# Patient Record
Sex: FEMALE | Race: WHITE | NOT HISPANIC OR LATINO | URBAN - METROPOLITAN AREA
[De-identification: names, ages, dates, MRNs, and addresses within clinical notes are randomized per-mention and may not be internally consistent; named-entity substitution may affect disease eponyms.]

---

## 2018-02-15 ENCOUNTER — EMERGENCY (EMERGENCY)
Facility: HOSPITAL | Age: 48
LOS: 1 days | Discharge: HOME | End: 2018-02-15
Attending: EMERGENCY MEDICINE | Admitting: EMERGENCY MEDICINE
Payer: MEDICAID

## 2018-02-15 ENCOUNTER — APPOINTMENT (OUTPATIENT)
Dept: INTERNAL MEDICINE | Facility: CLINIC | Age: 48
End: 2018-02-15

## 2018-02-15 VITALS
OXYGEN SATURATION: 100 % | TEMPERATURE: 98 F | DIASTOLIC BLOOD PRESSURE: 69 MMHG | HEART RATE: 61 BPM | HEIGHT: 64 IN | SYSTOLIC BLOOD PRESSURE: 107 MMHG | WEIGHT: 149.91 LBS | RESPIRATION RATE: 16 BRPM

## 2018-02-15 DIAGNOSIS — R10.13 EPIGASTRIC PAIN: ICD-10-CM

## 2018-02-15 LAB
ALBUMIN SERPL ELPH-MCNC: 3.6 G/DL — SIGNIFICANT CHANGE UP (ref 3.3–5)
ALP SERPL-CCNC: 55 U/L — SIGNIFICANT CHANGE UP (ref 40–120)
ALT FLD-CCNC: 14 U/L — SIGNIFICANT CHANGE UP (ref 12–78)
AMYLASE P1 CFR SERPL: 51 U/L — SIGNIFICANT CHANGE UP (ref 25–115)
ANION GAP SERPL CALC-SCNC: 8 MMOL/L — SIGNIFICANT CHANGE UP (ref 5–17)
AST SERPL-CCNC: 18 U/L — SIGNIFICANT CHANGE UP (ref 15–37)
BASOPHILS # BLD AUTO: 0.1 K/UL — SIGNIFICANT CHANGE UP (ref 0–0.2)
BASOPHILS NFR BLD AUTO: 1.1 % — SIGNIFICANT CHANGE UP (ref 0–2)
BILIRUB SERPL-MCNC: 0.3 MG/DL — SIGNIFICANT CHANGE UP (ref 0.2–1.2)
BUN SERPL-MCNC: 11 MG/DL — SIGNIFICANT CHANGE UP (ref 7–23)
CALCIUM SERPL-MCNC: 8.4 MG/DL — LOW (ref 8.5–10.1)
CHLORIDE SERPL-SCNC: 107 MMOL/L — SIGNIFICANT CHANGE UP (ref 96–108)
CO2 SERPL-SCNC: 25 MMOL/L — SIGNIFICANT CHANGE UP (ref 22–31)
CREAT SERPL-MCNC: 0.68 MG/DL — SIGNIFICANT CHANGE UP (ref 0.5–1.3)
EOSINOPHIL # BLD AUTO: 0.7 K/UL — HIGH (ref 0–0.5)
EOSINOPHIL NFR BLD AUTO: 8.2 % — HIGH (ref 0–6)
GLUCOSE SERPL-MCNC: 93 MG/DL — SIGNIFICANT CHANGE UP (ref 70–99)
HCG SERPL-ACNC: <1 MIU/ML — SIGNIFICANT CHANGE UP
HCT VFR BLD CALC: 40.9 % — SIGNIFICANT CHANGE UP (ref 34.5–45)
HGB BLD-MCNC: 13.5 G/DL — SIGNIFICANT CHANGE UP (ref 11.5–15.5)
LIDOCAIN IGE QN: 184 U/L — SIGNIFICANT CHANGE UP (ref 73–393)
LYMPHOCYTES # BLD AUTO: 1.9 K/UL — SIGNIFICANT CHANGE UP (ref 1–3.3)
LYMPHOCYTES # BLD AUTO: 23.8 % — SIGNIFICANT CHANGE UP (ref 13–44)
MCHC RBC-ENTMCNC: 29 PG — SIGNIFICANT CHANGE UP (ref 27–34)
MCHC RBC-ENTMCNC: 33 GM/DL — SIGNIFICANT CHANGE UP (ref 32–36)
MCV RBC AUTO: 88 FL — SIGNIFICANT CHANGE UP (ref 80–100)
MONOCYTES # BLD AUTO: 0.5 K/UL — SIGNIFICANT CHANGE UP (ref 0–0.9)
MONOCYTES NFR BLD AUTO: 6.4 % — SIGNIFICANT CHANGE UP (ref 1–9)
NEUTROPHILS # BLD AUTO: 4.9 K/UL — SIGNIFICANT CHANGE UP (ref 1.8–7.4)
NEUTROPHILS NFR BLD AUTO: 60.6 % — SIGNIFICANT CHANGE UP (ref 43–77)
PLATELET # BLD AUTO: 221 K/UL — SIGNIFICANT CHANGE UP (ref 150–400)
POTASSIUM SERPL-MCNC: 4.3 MMOL/L — SIGNIFICANT CHANGE UP (ref 3.5–5.3)
POTASSIUM SERPL-SCNC: 4.3 MMOL/L — SIGNIFICANT CHANGE UP (ref 3.5–5.3)
PROT SERPL-MCNC: 7.3 G/DL — SIGNIFICANT CHANGE UP (ref 6–8.3)
RBC # BLD: 4.64 M/UL — SIGNIFICANT CHANGE UP (ref 3.8–5.2)
RBC # FLD: 12.2 % — SIGNIFICANT CHANGE UP (ref 10.3–14.5)
SODIUM SERPL-SCNC: 140 MMOL/L — SIGNIFICANT CHANGE UP (ref 135–145)
WBC # BLD: 8.1 K/UL — SIGNIFICANT CHANGE UP (ref 3.8–10.5)
WBC # FLD AUTO: 8.1 K/UL — SIGNIFICANT CHANGE UP (ref 3.8–10.5)

## 2018-02-15 PROCEDURE — 93010 ELECTROCARDIOGRAM REPORT: CPT

## 2018-02-15 PROCEDURE — 99285 EMERGENCY DEPT VISIT HI MDM: CPT

## 2018-02-15 PROCEDURE — 83690 ASSAY OF LIPASE: CPT

## 2018-02-15 PROCEDURE — 93005 ELECTROCARDIOGRAM TRACING: CPT

## 2018-02-15 PROCEDURE — 96374 THER/PROPH/DIAG INJ IV PUSH: CPT

## 2018-02-15 PROCEDURE — 96375 TX/PRO/DX INJ NEW DRUG ADDON: CPT

## 2018-02-15 PROCEDURE — 99284 EMERGENCY DEPT VISIT MOD MDM: CPT | Mod: 25

## 2018-02-15 PROCEDURE — 82150 ASSAY OF AMYLASE: CPT

## 2018-02-15 PROCEDURE — 80053 COMPREHEN METABOLIC PANEL: CPT

## 2018-02-15 PROCEDURE — 84702 CHORIONIC GONADOTROPIN TEST: CPT

## 2018-02-15 PROCEDURE — 85027 COMPLETE CBC AUTOMATED: CPT

## 2018-02-15 RX ORDER — PANTOPRAZOLE SODIUM 20 MG/1
40 TABLET, DELAYED RELEASE ORAL ONCE
Qty: 0 | Refills: 0 | Status: COMPLETED | OUTPATIENT
Start: 2018-02-15 | End: 2018-02-15

## 2018-02-15 RX ORDER — ONDANSETRON 8 MG/1
4 TABLET, FILM COATED ORAL ONCE
Qty: 0 | Refills: 0 | Status: COMPLETED | OUTPATIENT
Start: 2018-02-15 | End: 2018-02-15

## 2018-02-15 RX ORDER — OMEPRAZOLE 10 MG/1
1 CAPSULE, DELAYED RELEASE ORAL
Qty: 14 | Refills: 0 | OUTPATIENT
Start: 2018-02-15 | End: 2018-02-28

## 2018-02-15 RX ORDER — SODIUM CHLORIDE 9 MG/ML
1000 INJECTION INTRAMUSCULAR; INTRAVENOUS; SUBCUTANEOUS ONCE
Qty: 0 | Refills: 0 | Status: COMPLETED | OUTPATIENT
Start: 2018-02-15 | End: 2018-02-15

## 2018-02-15 RX ADMIN — PANTOPRAZOLE SODIUM 40 MILLIGRAM(S): 20 TABLET, DELAYED RELEASE ORAL at 12:26

## 2018-02-15 RX ADMIN — ONDANSETRON 4 MILLIGRAM(S): 8 TABLET, FILM COATED ORAL at 12:26

## 2018-02-15 NOTE — ED PROVIDER NOTE - OBJECTIVE STATEMENT
family xlator per pt request  pt c/o few months of burning pain in chest radiating from epigatrum to throat, worse with eating or drinking. no fevers, chills, ha, sob, cough, diarrhea, dysuria.  pmd - ozcan

## 2018-02-15 NOTE — ED ADULT NURSE NOTE - CHIEF COMPLAINT QUOTE
abd bloating & bitter taste in month x 1 month  "has appointment w/ GI didn't want to wait until 2pm"

## 2018-02-15 NOTE — ED PROVIDER NOTE - MEDICAL DECISION MAKING DETAILS
pt c/o few months worsening buurning chest pain radiating from epigastric to throat, worse with eating or drinking - ekg/labs/ppi/zofran/ivf

## 2018-02-15 NOTE — ED ADULT TRIAGE NOTE - CHIEF COMPLAINT QUOTE
abd bloating & bitter taste in month x 1 month  "has appointment w/ GI didn't want to wait until 2pm" abd bloating & bitter taste in month x 1 month  denies pain  "has appointment w/ GI didn't want to wait until 2pm"

## 2018-02-15 NOTE — CONSULT NOTE ADULT - PROBLEM SELECTOR RECOMMENDATION 9
likely secondary to GERD  no urgency for endoscopy as inpatient as this pain is chronic and pt denies use of NSAIDs, tobacco  would send patient home on PPI BID and Carafate TID  outpatient endoscopy

## 2018-02-15 NOTE — ED PROVIDER NOTE - PROGRESS NOTE DETAILS
GI seen preet pt, requests d/c with PPI to f/u as outpt. Reevaluated patient at bedside.  Patient feeling improved.  Discussed the results of all diagnostic testing in ED and copies of all reports given.   An opportunity to ask questions was given.  Discussed the importance of prompt, close medical follow-up.  Patient will return with any changes, concerns or persistent / worsening symptoms.  Understanding of all instructions verbalized.

## 2018-02-22 ENCOUNTER — APPOINTMENT (OUTPATIENT)
Dept: GASTROENTEROLOGY | Facility: CLINIC | Age: 48
End: 2018-02-22

## 2019-02-13 NOTE — CONSULT NOTE ADULT - SUBJECTIVE AND OBJECTIVE BOX
Size Of Lesion: 1 cm Detail Level: Detailed Morphology Per Location (Optional): mobile nodule Chief Complaint:  Patient is a 48y old  Female who presents with a chief complaint of epigastric abdominal pain    HPI: 48 year old male with no significant past medical history presents after a few months of burning pain in chest radiating from epigastrium to throat, worse with eating or drinking. Pt had appointment to see outpatient GI today but they cancelled the appointment. Pt has a non-tender abdomen, denies vomiting, BRBPR, melena, diarrhea, abnormal bowel habits.   Had EGD/Colon 2-3 years ago as per pt, both were negative.       Allergies:  No Known Allergies      Medications:      PMHX/PSHX:  No pertinent past medical history  No significant past surgical history      Family history:      Social History: denies etoh, tobacco, illicit drug use     ROS:     General:  No wt loss, fevers, chills, night sweats, fatigue,   Eyes:  Good vision, no reported pain  ENT:  No sore throat, pain, runny nose, dysphagia  CV:  No pain, palpitations, hypo/hypertension  Resp:  No dyspnea, cough, tachypnea, wheezing  GI:  + epigastric pain, + nausea, No vomiting, No diarrhea, No constipation, No weight loss, No fever, No pruritis, No rectal bleeding, No tarry stools, No dysphagia,  :  No pain, bleeding, incontinence, nocturia  Muscle:  No pain, weakness  Neuro:  No weakness, tingling, memory problems  Psych:  No fatigue, insomnia, mood problems, depression  Endocrine:  No polyuria, polydipsia, cold/heat intolerance  Heme:  No petechiae, ecchymosis, easy bruisability  Skin:  No rash, tattoos, scars, edema      PHYSICAL EXAM:   Vital Signs:  Vital Signs Last 24 Hrs  T(C): 36.8 (15 Feb 2018 11:28), Max: 36.8 (15 Feb 2018 11:28)  T(F): 98.2 (15 Feb 2018 11:28), Max: 98.2 (15 Feb 2018 11:28)  HR: 61 (15 Feb 2018 11:28) (61 - 61)  BP: 107/69 (15 Feb 2018 11:28) (107/69 - 107/69)  BP(mean): --  RR: 16 (15 Feb 2018 11:28) (16 - 16)  SpO2: 100% (15 Feb 2018 11:28) (100% - 100%)  Daily Height in cm: 162.56 (15 Feb 2018 11:28)    Daily     GENERAL:  Appears stated age, well-groomed, well-nourished, no distress  HEENT:  NC/AT,  conjunctivae clear and pink, no thyromegaly, nodules, adenopathy, no JVD, sclera -anicteric  CHEST:  Full & symmetric excursion, no increased effort, breath sounds clear  HEART:  Regular rhythm, S1, S2, no murmur/rub/S3/S4, no abdominal bruit, no edema  ABDOMEN:  Soft, non-tender, non-distended, normoactive bowel sounds,  no masses ,no hepato-splenomegaly, no signs of chronic liver disease  EXTREMITIES:  no cyanosis, clubbing or edema  SKIN:  No rash/erythema/ecchymoses/petechiae/wounds/abscess/warm/dry  NEURO:  Alert, oriented, no asterixis, no tremor, no encephalopathy    LABS:                        13.5   8.1   )-----------( 221      ( 15 Feb 2018 12:19 )             40.9     02-15    140  |  107  |  11  ----------------------------<  93  4.3   |  25  |  0.68    Ca    8.4<L>      15 Feb 2018 12:19    TPro  7.3  /  Alb  3.6  /  TBili  0.3  /  DBili  x   /  AST  18  /  ALT  14  /  AlkPhos  55  02-15    LIVER FUNCTIONS - ( 15 Feb 2018 12:19 )  Alb: 3.6 g/dL / Pro: 7.3 g/dL / ALK PHOS: 55 U/L / ALT: 14 U/L / AST: 18 U/L / GGT: x               Amylase Serum51      Lipase [id number]       Ammonia--      Imaging:

## 2020-04-14 ENCOUNTER — EMERGENCY (EMERGENCY)
Facility: HOSPITAL | Age: 50
LOS: 1 days | Discharge: HOME | End: 2020-04-14
Attending: EMERGENCY MEDICINE | Admitting: EMERGENCY MEDICINE
Payer: MEDICAID

## 2020-04-14 VITALS
OXYGEN SATURATION: 96 % | TEMPERATURE: 98 F | DIASTOLIC BLOOD PRESSURE: 60 MMHG | HEART RATE: 56 BPM | SYSTOLIC BLOOD PRESSURE: 88 MMHG | RESPIRATION RATE: 14 BRPM | WEIGHT: 162.04 LBS | HEIGHT: 64 IN

## 2020-04-14 VITALS
HEART RATE: 61 BPM | SYSTOLIC BLOOD PRESSURE: 115 MMHG | RESPIRATION RATE: 17 BRPM | OXYGEN SATURATION: 98 % | DIASTOLIC BLOOD PRESSURE: 71 MMHG | TEMPERATURE: 98 F

## 2020-04-14 LAB
ALBUMIN SERPL ELPH-MCNC: 3.8 G/DL — SIGNIFICANT CHANGE UP (ref 3.3–5)
ALP SERPL-CCNC: 53 U/L — SIGNIFICANT CHANGE UP (ref 40–120)
ALT FLD-CCNC: 16 U/L — SIGNIFICANT CHANGE UP (ref 12–78)
ANION GAP SERPL CALC-SCNC: 7 MMOL/L — SIGNIFICANT CHANGE UP (ref 5–17)
APPEARANCE UR: CLEAR — SIGNIFICANT CHANGE UP
AST SERPL-CCNC: 16 U/L — SIGNIFICANT CHANGE UP (ref 15–37)
BASOPHILS # BLD AUTO: 0.05 K/UL — SIGNIFICANT CHANGE UP (ref 0–0.2)
BASOPHILS NFR BLD AUTO: 0.5 % — SIGNIFICANT CHANGE UP (ref 0–2)
BILIRUB SERPL-MCNC: 0.3 MG/DL — SIGNIFICANT CHANGE UP (ref 0.2–1.2)
BILIRUB UR-MCNC: NEGATIVE — SIGNIFICANT CHANGE UP
BUN SERPL-MCNC: 14 MG/DL — SIGNIFICANT CHANGE UP (ref 7–23)
CALCIUM SERPL-MCNC: 8.9 MG/DL — SIGNIFICANT CHANGE UP (ref 8.5–10.1)
CHLORIDE SERPL-SCNC: 107 MMOL/L — SIGNIFICANT CHANGE UP (ref 96–108)
CO2 SERPL-SCNC: 24 MMOL/L — SIGNIFICANT CHANGE UP (ref 22–31)
COLOR SPEC: YELLOW — SIGNIFICANT CHANGE UP
CREAT SERPL-MCNC: 0.86 MG/DL — SIGNIFICANT CHANGE UP (ref 0.5–1.3)
DIFF PNL FLD: NEGATIVE — SIGNIFICANT CHANGE UP
EOSINOPHIL # BLD AUTO: 0.36 K/UL — SIGNIFICANT CHANGE UP (ref 0–0.5)
EOSINOPHIL NFR BLD AUTO: 3.3 % — SIGNIFICANT CHANGE UP (ref 0–6)
GLUCOSE SERPL-MCNC: 110 MG/DL — HIGH (ref 70–99)
GLUCOSE UR QL: NEGATIVE — SIGNIFICANT CHANGE UP
HCG SERPL-ACNC: <1 MIU/ML — SIGNIFICANT CHANGE UP
HCT VFR BLD CALC: 40.4 % — SIGNIFICANT CHANGE UP (ref 34.5–45)
HGB BLD-MCNC: 13.4 G/DL — SIGNIFICANT CHANGE UP (ref 11.5–15.5)
IMM GRANULOCYTES NFR BLD AUTO: 0.5 % — SIGNIFICANT CHANGE UP (ref 0–1.5)
KETONES UR-MCNC: NEGATIVE — SIGNIFICANT CHANGE UP
LACTATE SERPL-SCNC: 2.4 MMOL/L — HIGH (ref 0.7–2)
LEUKOCYTE ESTERASE UR-ACNC: NEGATIVE — SIGNIFICANT CHANGE UP
LYMPHOCYTES # BLD AUTO: 1.46 K/UL — SIGNIFICANT CHANGE UP (ref 1–3.3)
LYMPHOCYTES # BLD AUTO: 13.4 % — SIGNIFICANT CHANGE UP (ref 13–44)
MCHC RBC-ENTMCNC: 28.4 PG — SIGNIFICANT CHANGE UP (ref 27–34)
MCHC RBC-ENTMCNC: 33.2 GM/DL — SIGNIFICANT CHANGE UP (ref 32–36)
MCV RBC AUTO: 85.6 FL — SIGNIFICANT CHANGE UP (ref 80–100)
MONOCYTES # BLD AUTO: 0.67 K/UL — SIGNIFICANT CHANGE UP (ref 0–0.9)
MONOCYTES NFR BLD AUTO: 6.2 % — SIGNIFICANT CHANGE UP (ref 2–14)
NEUTROPHILS # BLD AUTO: 8.3 K/UL — HIGH (ref 1.8–7.4)
NEUTROPHILS NFR BLD AUTO: 76.1 % — SIGNIFICANT CHANGE UP (ref 43–77)
NITRITE UR-MCNC: NEGATIVE — SIGNIFICANT CHANGE UP
NRBC # BLD: 0 /100 WBCS — SIGNIFICANT CHANGE UP (ref 0–0)
PH UR: 7 — SIGNIFICANT CHANGE UP (ref 5–8)
PLATELET # BLD AUTO: 200 K/UL — SIGNIFICANT CHANGE UP (ref 150–400)
POTASSIUM SERPL-MCNC: 4.2 MMOL/L — SIGNIFICANT CHANGE UP (ref 3.5–5.3)
POTASSIUM SERPL-SCNC: 4.2 MMOL/L — SIGNIFICANT CHANGE UP (ref 3.5–5.3)
PROT SERPL-MCNC: 7.8 G/DL — SIGNIFICANT CHANGE UP (ref 6–8.3)
PROT UR-MCNC: NEGATIVE — SIGNIFICANT CHANGE UP
RBC # BLD: 4.72 M/UL — SIGNIFICANT CHANGE UP (ref 3.8–5.2)
RBC # FLD: 14 % — SIGNIFICANT CHANGE UP (ref 10.3–14.5)
SODIUM SERPL-SCNC: 138 MMOL/L — SIGNIFICANT CHANGE UP (ref 135–145)
SP GR SPEC: 1 — LOW (ref 1.01–1.02)
UROBILINOGEN FLD QL: NEGATIVE — SIGNIFICANT CHANGE UP
WBC # BLD: 10.89 K/UL — HIGH (ref 3.8–10.5)
WBC # FLD AUTO: 10.89 K/UL — HIGH (ref 3.8–10.5)

## 2020-04-14 PROCEDURE — 76830 TRANSVAGINAL US NON-OB: CPT | Mod: 26

## 2020-04-14 PROCEDURE — 85027 COMPLETE CBC AUTOMATED: CPT

## 2020-04-14 PROCEDURE — 96361 HYDRATE IV INFUSION ADD-ON: CPT

## 2020-04-14 PROCEDURE — 83605 ASSAY OF LACTIC ACID: CPT

## 2020-04-14 PROCEDURE — 96374 THER/PROPH/DIAG INJ IV PUSH: CPT

## 2020-04-14 PROCEDURE — 70450 CT HEAD/BRAIN W/O DYE: CPT | Mod: 26

## 2020-04-14 PROCEDURE — 74177 CT ABD & PELVIS W/CONTRAST: CPT

## 2020-04-14 PROCEDURE — 87040 BLOOD CULTURE FOR BACTERIA: CPT

## 2020-04-14 PROCEDURE — 70450 CT HEAD/BRAIN W/O DYE: CPT

## 2020-04-14 PROCEDURE — 76830 TRANSVAGINAL US NON-OB: CPT

## 2020-04-14 PROCEDURE — 86850 RBC ANTIBODY SCREEN: CPT

## 2020-04-14 PROCEDURE — 93010 ELECTROCARDIOGRAM REPORT: CPT

## 2020-04-14 PROCEDURE — 80053 COMPREHEN METABOLIC PANEL: CPT

## 2020-04-14 PROCEDURE — 84702 CHORIONIC GONADOTROPIN TEST: CPT

## 2020-04-14 PROCEDURE — 86901 BLOOD TYPING SEROLOGIC RH(D): CPT

## 2020-04-14 PROCEDURE — 99284 EMERGENCY DEPT VISIT MOD MDM: CPT | Mod: 25

## 2020-04-14 PROCEDURE — 93005 ELECTROCARDIOGRAM TRACING: CPT

## 2020-04-14 PROCEDURE — 99284 EMERGENCY DEPT VISIT MOD MDM: CPT

## 2020-04-14 PROCEDURE — 86900 BLOOD TYPING SEROLOGIC ABO: CPT

## 2020-04-14 PROCEDURE — 81003 URINALYSIS AUTO W/O SCOPE: CPT

## 2020-04-14 PROCEDURE — 74177 CT ABD & PELVIS W/CONTRAST: CPT | Mod: 26

## 2020-04-14 PROCEDURE — 36415 COLL VENOUS BLD VENIPUNCTURE: CPT

## 2020-04-14 RX ORDER — SODIUM CHLORIDE 9 MG/ML
1000 INJECTION INTRAMUSCULAR; INTRAVENOUS; SUBCUTANEOUS ONCE
Refills: 0 | Status: COMPLETED | OUTPATIENT
Start: 2020-04-14 | End: 2020-04-14

## 2020-04-14 RX ORDER — KETOROLAC TROMETHAMINE 30 MG/ML
15 SYRINGE (ML) INJECTION ONCE
Refills: 0 | Status: DISCONTINUED | OUTPATIENT
Start: 2020-04-14 | End: 2020-04-14

## 2020-04-14 RX ADMIN — Medication 15 MILLIGRAM(S): at 09:37

## 2020-04-14 RX ADMIN — SODIUM CHLORIDE 1000 MILLILITER(S): 9 INJECTION INTRAMUSCULAR; INTRAVENOUS; SUBCUTANEOUS at 08:40

## 2020-04-14 RX ADMIN — SODIUM CHLORIDE 1000 MILLILITER(S): 9 INJECTION INTRAMUSCULAR; INTRAVENOUS; SUBCUTANEOUS at 09:40

## 2020-04-14 NOTE — ED PROVIDER NOTE - OBJECTIVE STATEMENT
51 yo F presents to ED c/o RLQ pain since yesterday. States pain began suddenly, gradually worsened. Now describes as 9/10 pain, constant, sharp, nonradiating. As per pt, family pt had syncopal episode today. Unknown head trauma. Pt states last she remembered was severe abd pain, and everything went "black"-- Pt denies N/V/D, urinary symptoms, fever/chills, chest pain, SOB, cough, URI symptoms, known covid contacts.

## 2020-04-14 NOTE — ED PROVIDER NOTE - ATTENDING CONTRIBUTION TO CARE
51 yo F p/w RLQ abd pain since yest. No fever / chills. no v/d. No cp/sob/palp. Pt with dec po intake, this am was feeling light headed, ? passed out x few seconds. Pt found hypotensive upon arrival. no KNOTT / easy recent fatigue. no known fever. No cough/congestion. no neck pain m/ stiffness. NO fall / trauma. no agg/allev factors. No other inj or co.  exam: MM Moist. neck supple. no meningeal signs. abd soft , tend to RLQ with vol guarding, no c/c/e. Nl resp effort. non-toxic appearing.  check labs, xr, CT, IVF, ekg

## 2020-04-14 NOTE — ED PROVIDER NOTE - PATIENT PORTAL LINK FT
You can access the FollowMyHealth Patient Portal offered by [facility] by registering at the following website: [email or web address]. By joining [employer]’s FollowMyHealth portal, you will also be able to view your health information using other applications (apps) compatible with our system.

## 2020-04-14 NOTE — ED ADULT NURSE NOTE - OBJECTIVE STATEMENT
Pt to ED c/o right lower abdominal pain, afebrile, Pt is [place]ym[place] speaking,  phone used, Khurram #[id number]. Plan of care explained to pt through , pt acknowledged understanding and has no questions, will continue to monitor

## 2020-04-19 LAB
CULTURE RESULTS: SIGNIFICANT CHANGE UP
CULTURE RESULTS: SIGNIFICANT CHANGE UP
SPECIMEN SOURCE: SIGNIFICANT CHANGE UP
SPECIMEN SOURCE: SIGNIFICANT CHANGE UP

## 2020-11-23 ENCOUNTER — APPOINTMENT (OUTPATIENT)
Dept: FAMILY MEDICINE | Facility: CLINIC | Age: 50
End: 2020-11-23
Payer: MEDICAID

## 2020-11-23 VITALS
SYSTOLIC BLOOD PRESSURE: 113 MMHG | OXYGEN SATURATION: 95 % | TEMPERATURE: 97.8 F | HEART RATE: 65 BPM | BODY MASS INDEX: 28.17 KG/M2 | WEIGHT: 165 LBS | HEIGHT: 64 IN | DIASTOLIC BLOOD PRESSURE: 76 MMHG

## 2020-11-23 VITALS — WEIGHT: 168 LBS

## 2020-11-23 DIAGNOSIS — Z87.898 PERSONAL HISTORY OF OTHER SPECIFIED CONDITIONS: ICD-10-CM

## 2020-11-23 DIAGNOSIS — Z82.49 FAMILY HISTORY OF ISCHEMIC HEART DISEASE AND OTHER DISEASES OF THE CIRCULATORY SYSTEM: ICD-10-CM

## 2020-11-23 DIAGNOSIS — Z13.31 ENCOUNTER FOR SCREENING FOR DEPRESSION: ICD-10-CM

## 2020-11-23 DIAGNOSIS — N83.201 UNSPECIFIED OVARIAN CYST, RIGHT SIDE: ICD-10-CM

## 2020-11-23 DIAGNOSIS — Z78.9 OTHER SPECIFIED HEALTH STATUS: ICD-10-CM

## 2020-11-23 DIAGNOSIS — D50.0 IRON DEFICIENCY ANEMIA SECONDARY TO BLOOD LOSS (CHRONIC): ICD-10-CM

## 2020-11-23 DIAGNOSIS — N83.202 UNSPECIFIED OVARIAN CYST, RIGHT SIDE: ICD-10-CM

## 2020-11-23 DIAGNOSIS — M65.9 SYNOVITIS AND TENOSYNOVITIS, UNSPECIFIED: ICD-10-CM

## 2020-11-23 DIAGNOSIS — R92.8 OTHER ABNORMAL AND INCONCLUSIVE FINDINGS ON DIAGNOSTIC IMAGING OF BREAST: ICD-10-CM

## 2020-11-23 DIAGNOSIS — Z00.00 ENCOUNTER FOR GENERAL ADULT MEDICAL EXAMINATION W/OUT ABNORMAL FINDINGS: ICD-10-CM

## 2020-11-23 DIAGNOSIS — K59.00 CONSTIPATION, UNSPECIFIED: ICD-10-CM

## 2020-11-23 PROCEDURE — G0444 DEPRESSION SCREEN ANNUAL: CPT

## 2020-11-23 PROCEDURE — 99072 ADDL SUPL MATRL&STAF TM PHE: CPT

## 2020-11-23 PROCEDURE — 93000 ELECTROCARDIOGRAM COMPLETE: CPT | Mod: 59

## 2020-11-23 PROCEDURE — 99386 PREV VISIT NEW AGE 40-64: CPT | Mod: 25

## 2020-11-23 RX ORDER — DICLOFENAC SODIUM 75 MG/1
75 TABLET, DELAYED RELEASE ORAL
Qty: 60 | Refills: 0 | Status: COMPLETED | COMMUNITY
Start: 2020-07-01

## 2020-11-23 RX ORDER — NYSTATIN 100000 [USP'U]/ML
100000 SUSPENSION ORAL
Qty: 480 | Refills: 0 | Status: COMPLETED | COMMUNITY
Start: 2020-07-01

## 2020-11-23 RX ORDER — CETIRIZINE HCL 10 MG
TABLET ORAL
Refills: 0 | Status: COMPLETED | COMMUNITY

## 2020-11-23 RX ORDER — FLUCONAZOLE 150 MG/1
150 TABLET ORAL
Qty: 1 | Refills: 0 | Status: COMPLETED | COMMUNITY
Start: 2020-10-17

## 2020-11-23 RX ORDER — HALOBETASOL PROPIONATE 0.5 MG/G
0.05 CREAM TOPICAL
Qty: 50 | Refills: 0 | Status: COMPLETED | COMMUNITY
Start: 2020-06-16

## 2020-11-23 NOTE — ASSESSMENT
[FreeTextEntry1] : #Dru care maintenance \par - Follow up routine labs\par - Refuses flu shot \par - Up to date on pap smear \par - Patient up to date on mammogram, will have breast U/S of left breast due to abnormal findings\par - colonoscopy done in 2017\par - depression screening negative\par - EKG sinus rhythm, no acute ischemic changes \par \par #Reflux \par - Trial of Pepcid \par - H pylori stool testing for bloating \par \par #Tenosynovitis of right thumb \par - Caution with NSAIDs given gastritis \par - Tylenol PRN for pain control \par \par #Allergic Rhinitis \par -Continue Zyrtec \par - START Flonase \par \par \par \par

## 2020-11-23 NOTE — PHYSICAL EXAM
[No Acute Distress] : no acute distress [Well Nourished] : well nourished [Well Developed] : well developed [Well-Appearing] : well-appearing [Normal Sclera/Conjunctiva] : normal sclera/conjunctiva [PERRL] : pupils equal round and reactive to light [EOMI] : extraocular movements intact [Normal Outer Ear/Nose] : the outer ears and nose were normal in appearance [Normal Oropharynx] : the oropharynx was normal [No Lymphadenopathy] : no lymphadenopathy [Supple] : supple [Thyroid Normal, No Nodules] : the thyroid was normal and there were no nodules present [No Respiratory Distress] : no respiratory distress  [No Accessory Muscle Use] : no accessory muscle use [Clear to Auscultation] : lungs were clear to auscultation bilaterally [Normal Rate] : normal rate  [Regular Rhythm] : with a regular rhythm [Normal S1, S2] : normal S1 and S2 [No Murmur] : no murmur heard [Pedal Pulses Present] : the pedal pulses are present [No Edema] : there was no peripheral edema [Soft] : abdomen soft [Non Tender] : non-tender [Non-distended] : non-distended [No Masses] : no abdominal mass palpated [No HSM] : no HSM [Normal Bowel Sounds] : normal bowel sounds [Normal Posterior Cervical Nodes] : no posterior cervical lymphadenopathy [Normal Anterior Cervical Nodes] : no anterior cervical lymphadenopathy [No CVA Tenderness] : no CVA  tenderness [No Spinal Tenderness] : no spinal tenderness [No Joint Swelling] : no joint swelling [Grossly Normal Strength/Tone] : grossly normal strength/tone [No Rash] : no rash [Coordination Grossly Intact] : coordination grossly intact [No Focal Deficits] : no focal deficits [Normal Gait] : normal gait [Normal Affect] : the affect was normal [Normal Insight/Judgement] : insight and judgment were intact

## 2020-11-23 NOTE — HISTORY OF PRESENT ILLNESS
[FreeTextEntry1] : Pt here for annual  [de-identified] : Patient is a 50 year old female with PMH anemia, GERD, constipation, cysts in bilateral breasts and ovaries, plantar fascitis, seasonal allergies who presents for annual physical. Patient is a native [place] speaker; refuses  services and would like daughter Bradford to translate at bedside.  Patient doing well overall; is complaining of some reflux symptoms. She had an endoscopy and colonoscopy 3 years ago which showed esophagitis; however, she has not been on any medication other than Tums, which provides minimal relief. She has also been experiencing bloating and early satiety which has been worsening over the past year, as well as weight gain for the past few moths despite eating well. She was tested for H Pylori in the past which was negative. \par \par Patient has also been complaining of right thumb and index finger pain for the past two weeks,. She finds it hard to grasp objects. Denies numbness or tingling in the area.

## 2020-11-23 NOTE — REVIEW OF SYSTEMS
[Recent Change In Weight] : ~T recent weight change [Heartburn] : heartburn [Negative] : Heme/Lymph [FreeTextEntry7] : bloating

## 2020-11-23 NOTE — HEALTH RISK ASSESSMENT
[Intercurrent hospitalizations] : was admitted to the hospital  [No falls in past year] : Patient reported no falls in the past year [0] : 2) Feeling down, depressed, or hopeless: Not at all (0) [Patient reported PAP Smear was normal] : Patient reported PAP Smear was normal [Patient reported colonoscopy was normal] : Patient reported colonoscopy was normal [With Family] : lives with family [Unemployed] : unemployed [] :  [Good] : ~his/her~  mood as  good [No] : In the past 12 months have you used drugs other than those required for medical reasons? No [Patient reported mammogram was abnormal] : Patient reported mammogram was abnormal [HIV test declined] : HIV test declined [None] : None [Feels Safe at Home] : Feels safe at home [Fully functional (bathing, dressing, toileting, transferring, walking, feeding)] : Fully functional (bathing, dressing, toileting, transferring, walking, feeding) [Fully functional (using the telephone, shopping, preparing meals, housekeeping, doing laundry, using] : Fully functional and needs no help or supervision to perform IADLs (using the telephone, shopping, preparing meals, housekeeping, doing laundry, using transportation, managing medications and managing finances) [Seat Belt] :  uses seat belt [Patient/Caregiver not ready to engage] : Patient/Caregiver not ready to engage [] : No [de-identified] : syncope/ constipation 4/2020 [[id number]] : 0 [Change in mental status noted] : No change in mental status noted [Language] : denies difficulty with language [Behavior] : denies difficulty with behavior [Reasoning] : denies difficulty with reasoning [Reports changes in hearing] : Reports no changes in hearing [Reports changes in vision] : Reports no changes in vision [Reports changes in dental health] : Reports no changes in dental health [MammogramDate] : 11/2020 [MammogramComments] : left breast noted to be abnormal, will have breast U/S on 12/2 [PapSmearDate] : 11/2020 [ColonoscopyDate] : 1/2017

## 2020-11-23 NOTE — PAST MEDICAL HISTORY
[Perimenopausal] : perimenopausal [Approximately ___] : the LMP was approximately [unfilled] [Irregular Cycle Intervals] : are  irregular [Total Preg ___] : G[unfilled] [Live Births ___] : P[unfilled]  [Abortions ___] : Abortions:[unfilled] [FreeTextEntry1] : IUD in place

## 2020-12-07 ENCOUNTER — TRANSCRIPTION ENCOUNTER (OUTPATIENT)
Age: 50
End: 2020-12-07

## 2020-12-07 LAB
ALBUMIN SERPL ELPH-MCNC: 4.6 G/DL
ALP BLD-CCNC: 55 U/L
ALT SERPL-CCNC: 8 U/L
ANION GAP SERPL CALC-SCNC: 12 MMOL/L
AST SERPL-CCNC: 12 U/L
BASOPHILS # BLD AUTO: 0.07 K/UL
BASOPHILS NFR BLD AUTO: 0.9 %
BILIRUB SERPL-MCNC: 0.2 MG/DL
BUN SERPL-MCNC: 11 MG/DL
CALCIUM SERPL-MCNC: 9.4 MG/DL
CHLORIDE SERPL-SCNC: 102 MMOL/L
CHOLEST SERPL-MCNC: 221 MG/DL
CO2 SERPL-SCNC: 24 MMOL/L
CREAT SERPL-MCNC: 0.83 MG/DL
EOSINOPHIL # BLD AUTO: 0.55 K/UL
EOSINOPHIL NFR BLD AUTO: 7.2 %
ESTIMATED AVERAGE GLUCOSE: 114 MG/DL
FERRITIN SERPL-MCNC: 41 NG/ML
GLUCOSE SERPL-MCNC: 95 MG/DL
H PYLORI AG STL QL: NOT DETECTED
HBA1C MFR BLD HPLC: 5.6 %
HCT VFR BLD CALC: 44.9 %
HDLC SERPL-MCNC: 54 MG/DL
HGB BLD-MCNC: 14.2 G/DL
IMM GRANULOCYTES NFR BLD AUTO: 0.3 %
IRON SATN MFR SERPL: 20 %
IRON SERPL-MCNC: 57 UG/DL
LDLC SERPL CALC-MCNC: 152 MG/DL
LYMPHOCYTES # BLD AUTO: 2.27 K/UL
LYMPHOCYTES NFR BLD AUTO: 29.9 %
MAN DIFF?: NORMAL
MCHC RBC-ENTMCNC: 28.1 PG
MCHC RBC-ENTMCNC: 31.6 GM/DL
MCV RBC AUTO: 88.7 FL
MONOCYTES # BLD AUTO: 0.47 K/UL
MONOCYTES NFR BLD AUTO: 6.2 %
NEUTROPHILS # BLD AUTO: 4.21 K/UL
NEUTROPHILS NFR BLD AUTO: 55.5 %
NONHDLC SERPL-MCNC: 168 MG/DL
PLATELET # BLD AUTO: 221 K/UL
POTASSIUM SERPL-SCNC: 4.5 MMOL/L
PROT SERPL-MCNC: 7.3 G/DL
RBC # BLD: 5.06 M/UL
RBC # FLD: 13.6 %
SODIUM SERPL-SCNC: 138 MMOL/L
T4 FREE SERPL-MCNC: 1.1 NG/DL
TIBC SERPL-MCNC: 290 UG/DL
TRIGL SERPL-MCNC: 80 MG/DL
TSH SERPL-ACNC: 2.5 UIU/ML
UIBC SERPL-MCNC: 233 UG/DL
WBC # FLD AUTO: 7.59 K/UL

## 2021-03-24 ENCOUNTER — RX RENEWAL (OUTPATIENT)
Age: 51
End: 2021-03-24

## 2021-09-03 ENCOUNTER — APPOINTMENT (OUTPATIENT)
Dept: FAMILY MEDICINE | Facility: CLINIC | Age: 51
End: 2021-09-03
Payer: MEDICAID

## 2021-09-03 VITALS
HEIGHT: 64 IN | WEIGHT: 168 LBS | SYSTOLIC BLOOD PRESSURE: 106 MMHG | HEART RATE: 76 BPM | DIASTOLIC BLOOD PRESSURE: 72 MMHG | BODY MASS INDEX: 28.68 KG/M2 | RESPIRATION RATE: 14 BRPM | OXYGEN SATURATION: 98 %

## 2021-09-03 DIAGNOSIS — M19.90 UNSPECIFIED OSTEOARTHRITIS, UNSPECIFIED SITE: ICD-10-CM

## 2021-09-03 DIAGNOSIS — J30.9 ALLERGIC RHINITIS, UNSPECIFIED: ICD-10-CM

## 2021-09-03 DIAGNOSIS — M25.561 PAIN IN RIGHT KNEE: ICD-10-CM

## 2021-09-03 DIAGNOSIS — M25.562 PAIN IN RIGHT KNEE: ICD-10-CM

## 2021-09-03 PROCEDURE — 99214 OFFICE O/P EST MOD 30 MIN: CPT

## 2021-09-03 RX ORDER — CETIRIZINE HCL/PSEUDOEPHEDRINE 5 MG-120MG
5-120 TABLET, EXTENDED RELEASE 12 HR ORAL
Refills: 0 | Status: COMPLETED | COMMUNITY
End: 2021-09-03

## 2021-09-03 NOTE — HISTORY OF PRESENT ILLNESS
[FreeTextEntry8] : Patient is here today for an acute visit. \par She has been having difficulty with allergies lately. She has been having difficulty with shortness of breath, watery eyes, sneezing. She does not take anything for it. \par She is also concerned that she has arthritis.  Fingers and knees are hurting.  It hurts her most of the time but she does not take anything for her pain.  She was concerned to take anything as she has had gastric bypass surgery. \par For her foot, the podiatrist did cortisone injections in both feet but she has not had any relief from it.  The pain is under her foot all across. She is interested in considering physical therapy.

## 2021-09-03 NOTE — REVIEW OF SYSTEMS
[Redness] : redness [Itching] : itching [Shortness Of Breath] : shortness of breath [Joint Pain] : joint pain [Negative] : Psychiatric [Wheezing] : no wheezing [Cough] : no cough [FreeTextEntry4] : sneezing, runny nose

## 2021-09-03 NOTE — PHYSICAL EXAM
[No Acute Distress] : no acute distress [Well Nourished] : well nourished [Well Developed] : well developed [Normal Sclera/Conjunctiva] : normal sclera/conjunctiva [PERRL] : pupils equal round and reactive to light [Normal Outer Ear/Nose] : the outer ears and nose were normal in appearance [Normal Oropharynx] : the oropharynx was normal [No Respiratory Distress] : no respiratory distress  [No Accessory Muscle Use] : no accessory muscle use [Clear to Auscultation] : lungs were clear to auscultation bilaterally [Normal Rate] : normal rate  [Regular Rhythm] : with a regular rhythm [Normal S1, S2] : normal S1 and S2 [No Edema] : there was no peripheral edema [Grossly Normal Strength/Tone] : grossly normal strength/tone [No Rash] : no rash [No Focal Deficits] : no focal deficits [Normal Gait] : normal gait [Normal Affect] : the affect was normal [Normal Insight/Judgement] : insight and judgment were intact

## 2021-09-03 NOTE — ASSESSMENT
[FreeTextEntry1] : Allergies\par - seasonal and environmental\par - start allegra\par - flonase for sneezing\par - rescue inhaler to use if short of breath\par \par Arthritis\par Knee pain\par plantar fasciitis\par - advised to use tylenol arthritis for pain\par - unable to take nsaids due to bariatric surgery\par - has seen podiatry for injections but not helpful\par - advised supportive shoes all the time with inserts\par - can see ortho foot and ankle for second opinion\par - physical therapy script given\par - will check labs to rule out RA

## 2021-09-10 ENCOUNTER — TRANSCRIPTION ENCOUNTER (OUTPATIENT)
Age: 51
End: 2021-09-10

## 2021-09-10 LAB
ANA SER IF-ACNC: NEGATIVE
BASOPHILS # BLD AUTO: 0.07 K/UL
BASOPHILS NFR BLD AUTO: 0.9 %
CCP AB SER IA-ACNC: 74 UNITS
EOSINOPHIL # BLD AUTO: 0.59 K/UL
EOSINOPHIL NFR BLD AUTO: 7.4 %
HCT VFR BLD CALC: 41.2 %
HGB BLD-MCNC: 13.8 G/DL
IMM GRANULOCYTES NFR BLD AUTO: 0.5 %
LYMPHOCYTES # BLD AUTO: 2.39 K/UL
LYMPHOCYTES NFR BLD AUTO: 30.1 %
MAN DIFF?: NORMAL
MCHC RBC-ENTMCNC: 28.2 PG
MCHC RBC-ENTMCNC: 33.5 GM/DL
MCV RBC AUTO: 84.3 FL
MONOCYTES # BLD AUTO: 0.51 K/UL
MONOCYTES NFR BLD AUTO: 6.4 %
NEUTROPHILS # BLD AUTO: 4.35 K/UL
NEUTROPHILS NFR BLD AUTO: 54.7 %
PLATELET # BLD AUTO: 230 K/UL
RBC # BLD: 4.89 M/UL
RBC # FLD: 14.4 %
RF+CCP IGG SER-IMP: ABNORMAL
RHEUMATOID FACT SER QL: <10 IU/ML
WBC # FLD AUTO: 7.95 K/UL

## 2021-09-16 NOTE — ED PROVIDER NOTE - SEVERITY
PATIENT/VISITOR WELLNESS SCREENING    Step 1 Patient Screening    1. In the last month, have you been in contact with someone who was confirmed or suspected to have Coronavirus/COVID-19? No    2. Do you have the following symptoms?  Fever/Chills? No   Cough? No   Shortness of breath? No   New loss of taste or smell? No  Sore throat? No  Muscle or body aches? No  Headaches? No  Fatigue? No  Vomiting or diarrhea? No    Step 2 Visitor Screening    1. Name of Visitor (1 visitor per patient): Leonila    2. In the last month, have you been in contact with someone who was confirmed or suspected to have Coronavirus/COVID-19? No    3. Do you have the following symptoms?  Fever/Chills? No   Cough? No   Shortness of breath? No   Skin rash? No   Loss of taste or smell? No  Sore throat? No  Runny or stuffy nose? No  Muscle or body aches? No  Headaches? No  Fatigue? No  Vomiting or diarrhea? No       MODERATE

## 2021-09-24 ENCOUNTER — RX RENEWAL (OUTPATIENT)
Age: 51
End: 2021-09-24

## 2022-04-13 ENCOUNTER — APPOINTMENT (OUTPATIENT)
Dept: ORTHOPEDIC SURGERY | Facility: CLINIC | Age: 52
End: 2022-04-13
Payer: MEDICAID

## 2022-04-13 ENCOUNTER — NON-APPOINTMENT (OUTPATIENT)
Age: 52
End: 2022-04-13

## 2022-04-13 PROCEDURE — 99204 OFFICE O/P NEW MOD 45 MIN: CPT

## 2022-04-13 PROCEDURE — 73630 X-RAY EXAM OF FOOT: CPT | Mod: LT,RT

## 2022-04-13 NOTE — HISTORY OF PRESENT ILLNESS
[FreeTextEntry1] : The patient is a 52 year old female presenting with her daughter for an initial evaluation of bilateral foot pain x 2 years. The patient's daughter acted as an  in [place] for today's evaluation. The patient cannot attribute their pain to any injury, fall, or trauma. The patient localizes her pain to the plantar surface of her foot. Her pain scale is stated to be an 8 out of 10 for both of her feet. Pain is worsened with standing and with weightbearing. Pain is at worst in the morning as per the patient's daughter. The timing of her pain is stated to be constant.She was evaluated for this by another provider in [place], who gave the patient a cortisone injection to her foot. She has no numbness or tingling sensations to her foot. She cannot take NSAIDs at this time. She is wearing flat shoes. No other complaints. \par

## 2022-04-13 NOTE — DISCUSSION/SUMMARY
[de-identified] : Assessment: Bilateral foot Plantar Fasciitis\par \par Plan:\par #1. Anti-inflammatories/Tylenol as needed for pain. I recommend that the patient utilize Voltaren gel topically. If the Voltaren gel could not be obtained, Icy Hot, Biofreeze, or Bengay can be utilized instead.		\par #2. Home stretching program/physical therapy prescription given.\par #3. Dr. Anderson's insoles/ gel heel cups.\par #4. Epsom Salt Baths daily\par #5.Dorsal Night Splint. Use as instructed/as directed.\par #6. I advised the patient to utilize a frozen water bottle or golf ball as a foot roller/massager.		\par #7. All Questions answered. The patient understood the treatment plan above. Follow up in 2 months PRN for re-evaluation. If there is no improvement, we will obtain MRIs for further evaluation. \par

## 2022-04-13 NOTE — REASON FOR VISIT
[Initial Visit] : an initial visit for [Family Member] : family member [FreeTextEntry2] : bilateral foot pain

## 2022-04-13 NOTE — ADDENDUM
[FreeTextEntry1] : I, Yanira Kwan, acted solely as a scribe for Dr. Adonis Gee on this date 04/13/2022.\par \par All medical record entries made by the Scribe were at my, Dr. Adonis Gee, direction and personally dictated by me on 04/13/2022 . I have reviewed the chart and agree that the record accurately reflects my personal performance of the history, physical exam, assessment and plan. I have also personally directed, reviewed, and agreed with the chart.	\par

## 2022-04-13 NOTE — PHYSICAL EXAM
[de-identified] : General: Alert and oriented x3. In no acute distress. Pleasant in nature with a normal affect. No apparent respiratory distress.\par \par Bilateral Foot Exam\par Skin: Clean, dry, intact\par Inspection: No obvious malalignment, no masses, no swelling, no effusion\par Pulses: 2+ DP/PT pulses\par ROM: FOOT Full  ROM of digits, ANKLE 10 degrees of dorsiflexion, 40 degrees of plantarflexion, 10 degrees of subtalar motion.\par Painful ROM: None\par Tenderness: No tenderness over the medial malleolus, No tenderness over the lateral malleolus, no CFL/ATFL/PTFL pain, no deltoid ligament pain. No heel pain. No Achilles tenderness. No 5th metatarsal pain. No pain to the LisFranc joint. No ttp over the posterior tibial tendon.\par Stability: Negative anterior/posterior drawer.\par Strength: 5/5 ADD/ABD/TA/GS/EHL/FHL/EDL\par Neuro: Sensation in tact to light touch throughout\par Additional tests: Negative Mortons test, negative tarsal tunnel tinels, negative single heel rise.	 [de-identified] : X-rays of the bilateral foot were ordered, obtained, and reviewed by me today, 04/13/2022, revealed: No acute fractures. \par

## 2022-04-14 ENCOUNTER — APPOINTMENT (OUTPATIENT)
Dept: ORTHOPEDIC SURGERY | Facility: CLINIC | Age: 52
End: 2022-04-14
Payer: MEDICAID

## 2022-04-14 DIAGNOSIS — M23.91 UNSPECIFIED INTERNAL DERANGEMENT OF RIGHT KNEE: ICD-10-CM

## 2022-04-14 DIAGNOSIS — M23.92 UNSPECIFIED INTERNAL DERANGEMENT OF RIGHT KNEE: ICD-10-CM

## 2022-04-14 PROCEDURE — 73565 X-RAY EXAM OF KNEES: CPT

## 2022-04-14 PROCEDURE — 99214 OFFICE O/P EST MOD 30 MIN: CPT

## 2022-04-14 NOTE — DISCUSSION/SUMMARY
[de-identified] : TARA BLACK is a 52 year female being seen for initial visit bilat knee pain. She speaks Lao and English. She is here with her daughter. An interpretor was offered to the patient but she declines and prefers her daughter. She c/o multiple joint aches and pains. She has an upcoming Rheum appt. Denies ticks bites or history of lymes. She's being worked up for autoimmune diseases. She today would like to focus on both knees. She's had atraumatic knee pain for many years. She's being treated for plantars fasc. by foot and ankle. She denies instability in the knees. She localizes the pain to the back of her knees. She has pain and weakness transcending stairs. Occasionally has back and hip pain as well. \par \par We had a thorough discussion regarding the nature of her pain, the pathophysiology, as well as all treatment options. Based on her clinical exam, and radiographs, she has questionable underlying inflammatory for which I referred her to RHEUM for further dx and tx. I recommend that patient obtains OTC Voltaren gel and apply BID to respective area. In case, all her rheumatologic studies are negative then we will consider MRI for further dx and tx. Patient will follow up on prn basis for repeat clinical assessment. All questions were answered and the patient verbalized understanding. The patient is in agreement with this treatment plan.

## 2022-04-14 NOTE — PHYSICAL EXAM
[de-identified] : Physical Exam:\par General: Well appearing, no acute distress\par Neurologic: A&Ox3, No focal deficits\par Head: NCAT without abrasions, lacerations, or ecchymosis to head, face, or scalp\par Eyes: No scleral icterus, no gross abnormalities\par Respiratory: Equal chest wall expansion bilaterally, no accessory muscle use\par Lymphatic: No lymphadenopathy palpated\par Skin: Warm and dry\par Psychiatric: Normal mood and affect \par \par Right Knee: Range of Motion in Degrees	\par 	  Claimant:	Normal:	\par Flexion Active	 135 	 135-degrees	\par Flexion Passive	 135	 135-degrees	\par Extension Active	 0-5	 0-5-degrees	\par Extension Passive	 0-5	 0-5-degrees	\par \par No weakness to flexion/extension. No evidence of instability in the AP plane or varus or valgus stress. Negative Lachman. Negative pivot shift. Negative anterior drawer test. Negative posterior drawer test. Negative Vic. Negative Apley grind. negative Amrit test. No medial or lateral joint line tenderness. No tenderness over the medial and lateral facet of the patella. No patellofemoral crepitations. No lateral tilting patella. No patellar apprehension. No crepitation in the medial and lateral femoral condyle. No proximal or distal swelling, edema or tenderness. No gross motor or sensory deficits. No intra-articular swelling. 2+ DP and PT pulses. No varus or valgus malalignment. Skin is intact. No rashes, scars or lesions.\par \par Left Knee: Range of Motion in Degrees	\par 	  Claimant:	Normal:	\par Flexion Active	 135 	 135-degrees	\par Flexion Passive	 135	 135-degrees	\par Extension Active	 0-5	 0-5-degrees	\par Extension Passive	 0-5	 0-5-degrees	\par \par No weakness to flexion/extension. No evidence of instability in the AP plane or varus or valgus stress. Negative Lachman. Negative pivot shift. Negative anterior drawer test. Negative posterior drawer test. Negative Vic. Negative Apley grind. Negative Amrit test. No medial or lateral joint line tenderness. No tenderness over the medial and lateral facet of the patella. No patellofemoral crepitations. No lateral tilting patella. No patellar apprehension. No crepitation in the medial and lateral femoral condyle. No proximal or distal swelling, edema or tenderness. No gross motor or sensory deficits. No intra-articular swelling. 2+ DP and PT pulses. No varus or valgus malalignment. Skin is intact. No rashes, scars or lesions.  [de-identified] : 4 views of L knee were performed today and available for me to review. Results were discussed with the patient. They demonstrate no f/x, dislocation or other deformity.\par \par 4 views of R knee were performed today and available for me to review. Results were discussed with the patient. They demonstrate no f/x, dislocation or other deformity.\par

## 2022-04-14 NOTE — HISTORY OF PRESENT ILLNESS
[Stable] : stable [___ yrs] : [unfilled] year(s) ago [2] : a current pain level of 2/10 [3] : an average pain level of 3/10 [Walking] : worsened by walking [Knee Flexion] : worsened with knee flexion [Ice] : relieved by ice [Rest] : relieved by rest [de-identified] : TARA BLACK is a 52 year female being seen for initial visit bilat knee pain. She speaks Korean and English. She is here with her daughter. An interpretor was offered to the patient but she declines and prefers her daughter. She c/o multiple joint aches and pains. She has an upcoming Rheum appt. Denies ticks bites or history of lymes. She's being worked up for autoimmune diseases. She today would like to focus on both knees. She's had atraumatic knee pain for many years. She's being treated for plantars fasc. by foot and ankle. She denies instability in the knees. She localizes the pain to the back of her knees. She has pain and weakness transcending stairs. Occasionally has back and hip pain as well.

## 2022-04-22 ENCOUNTER — APPOINTMENT (OUTPATIENT)
Dept: RHEUMATOLOGY | Facility: CLINIC | Age: 52
End: 2022-04-22

## 2022-05-06 ENCOUNTER — APPOINTMENT (OUTPATIENT)
Dept: RHEUMATOLOGY | Facility: CLINIC | Age: 52
End: 2022-05-06

## 2022-07-01 ENCOUNTER — NON-APPOINTMENT (OUTPATIENT)
Age: 52
End: 2022-07-01

## 2022-07-11 ENCOUNTER — APPOINTMENT (OUTPATIENT)
Dept: RADIOLOGY | Facility: CLINIC | Age: 52
End: 2022-07-11

## 2022-07-11 ENCOUNTER — OUTPATIENT (OUTPATIENT)
Dept: OUTPATIENT SERVICES | Facility: HOSPITAL | Age: 52
LOS: 1 days | End: 2022-07-11
Payer: MEDICAID

## 2022-07-11 DIAGNOSIS — M15.1 HEBERDEN'S NODES (WITH ARTHROPATHY): ICD-10-CM

## 2022-07-11 DIAGNOSIS — R79.89 OTHER SPECIFIED ABNORMAL FINDINGS OF BLOOD CHEMISTRY: ICD-10-CM

## 2022-07-11 PROCEDURE — 72202 X-RAY EXAM SI JOINTS 3/> VWS: CPT

## 2022-07-11 PROCEDURE — 73130 X-RAY EXAM OF HAND: CPT

## 2022-07-11 PROCEDURE — 73130 X-RAY EXAM OF HAND: CPT | Mod: 26,LT,RT

## 2022-07-11 PROCEDURE — 72202 X-RAY EXAM SI JOINTS 3/> VWS: CPT | Mod: 26

## 2022-07-27 ENCOUNTER — APPOINTMENT (OUTPATIENT)
Dept: ORTHOPEDIC SURGERY | Facility: CLINIC | Age: 52
End: 2022-07-27

## 2022-07-27 PROCEDURE — 99213 OFFICE O/P EST LOW 20 MIN: CPT

## 2022-07-27 NOTE — PHYSICAL EXAM
[de-identified] : General: Alert and oriented x3. In no acute distress. Pleasant in nature with a normal affect. No apparent respiratory distress.\par \par Bilateral Foot Exam\par Skin: Clean, dry, intact\par Inspection: No obvious malalignment, no masses, no swelling, no effusion\par Pulses: 2+ DP/PT pulses\par ROM: FOOT Full  ROM of digits, ANKLE 10 degrees of dorsiflexion, 40 degrees of plantarflexion, 10 degrees of subtalar motion.\par Painful ROM: None\par Tenderness: No tenderness over the medial malleolus, No tenderness over the lateral malleolus, no CFL/ATFL/PTFL pain, no deltoid ligament pain. No heel pain. No Achilles tenderness. No 5th metatarsal pain. No pain to the LisFranc joint. No ttp over the posterior tibial tendon.\par Stability: Negative anterior/posterior drawer.\par Strength: 5/5 ADD/ABD/TA/GS/EHL/FHL/EDL\par Neuro: Sensation in tact to light touch throughout\par Additional tests: Negative Mortons test, negative tarsal tunnel tinels, negative single heel rise.	 [de-identified] : X-rays of the bilateral foot were ordered, obtained, and reviewed by me today, 04/13/2022, revealed: No acute fractures. \par

## 2022-07-27 NOTE — REASON FOR VISIT
[Follow-Up Visit] : a follow-up visit for [Family Member] : family member [FreeTextEntry2] : bilateral foot pain

## 2022-07-27 NOTE — DISCUSSION/SUMMARY
[de-identified] : Assessment: Bilateral foot Plantar Fasciitis\par \par **I would like to go ahead and order bilateral foot MRIs without contrast to evaluate the plantar fascia ligaments of both feet for tearing/bone marrow edema at the attachment to the calcaneus.  The patient will continue with the plan below for bilateral plantar fasciitis.  Once the MRI is completed, the patient will follow-up with Dr. Gee in office to review both MRIs.  Hold off on injections.  The patient has had injections in the past which did not help her pains.\par \par Plan:\par #1. Anti-inflammatories/Tylenol as needed for pain. I recommend that the patient utilize Voltaren gel topically. If the Voltaren gel could not be obtained, Icy Hot, Biofreeze, or Bengay can be utilized instead.		\par #2. Home stretching program/physical therapy prescription given.\par #3. Dr. Anderson's insoles/ gel heel cups.\par #4. Epsom Salt Baths daily\par #5.Dorsal Night Splint. Use as instructed/as directed.\par #6. I advised the patient to utilize a frozen water bottle or golf ball as a foot roller/massager.		\par #7. All Questions answered.\par \par

## 2022-07-27 NOTE — HISTORY OF PRESENT ILLNESS
[FreeTextEntry1] : 7/27/22: The patient is here for follow-up of bilateral foot pain.  The patient continues to have heel pain at the plantar fascia insertion for both feet.  She states that both feet are equally painful.  Since her last office visit in April, the patient has done 6 weeks of physical therapy which has not helped with the pains in her feet.  She has tried changing her shoewear which has not helped.  She continues to walk with a limp due to the pain in both feet, plantar fascia.  No numbness or tingling in the foot or ankle.  No other complaints.\par \par At this time the patient has failed conservative management for the past 6 weeks which also included anti-inflammatories.\par \par 4/13/22: The patient is a 52 year old female presenting with her daughter for an initial evaluation of bilateral foot pain x 2 years. The patient's daughter acted as an  in [place] for today's evaluation. The patient cannot attribute their pain to any injury, fall, or trauma. The patient localizes her pain to the plantar surface of her foot. Her pain scale is stated to be an 8 out of 10 for both of her feet. Pain is worsened with standing and with weightbearing. Pain is at worst in the morning as per the patient's daughter. The timing of her pain is stated to be constant.She was evaluated for this by another provider in [place], who gave the patient a cortisone injection to her foot. She has no numbness or tingling sensations to her foot. She cannot take NSAIDs at this time. She is wearing flat shoes. No other complaints. \par

## 2022-08-03 ENCOUNTER — APPOINTMENT (OUTPATIENT)
Dept: RHEUMATOLOGY | Facility: CLINIC | Age: 52
End: 2022-08-03

## 2022-08-04 ENCOUNTER — RESULT REVIEW (OUTPATIENT)
Age: 52
End: 2022-08-04

## 2022-08-09 ENCOUNTER — APPOINTMENT (OUTPATIENT)
Dept: MRI IMAGING | Facility: CLINIC | Age: 52
End: 2022-08-09

## 2022-08-09 ENCOUNTER — RESULT REVIEW (OUTPATIENT)
Age: 52
End: 2022-08-09

## 2022-08-09 ENCOUNTER — OUTPATIENT (OUTPATIENT)
Dept: OUTPATIENT SERVICES | Facility: HOSPITAL | Age: 52
LOS: 1 days | End: 2022-08-09
Payer: MEDICAID

## 2022-08-09 ENCOUNTER — OUTPATIENT (OUTPATIENT)
Dept: OUTPATIENT SERVICES | Facility: HOSPITAL | Age: 52
LOS: 1 days | End: 2022-08-09

## 2022-08-09 DIAGNOSIS — M72.2 PLANTAR FASCIAL FIBROMATOSIS: ICD-10-CM

## 2022-08-09 DIAGNOSIS — Z00.8 ENCOUNTER FOR OTHER GENERAL EXAMINATION: ICD-10-CM

## 2022-08-09 DIAGNOSIS — M79.671 PAIN IN RIGHT FOOT: ICD-10-CM

## 2022-08-09 PROCEDURE — 73718 MRI LOWER EXTREMITY W/O DYE: CPT | Mod: 26,RT

## 2022-08-09 PROCEDURE — 73718 MRI LOWER EXTREMITY W/O DYE: CPT

## 2022-09-30 ENCOUNTER — APPOINTMENT (OUTPATIENT)
Dept: ORTHOPEDIC SURGERY | Facility: CLINIC | Age: 52
End: 2022-09-30

## 2022-09-30 DIAGNOSIS — M72.2 PLANTAR FASCIAL FIBROMATOSIS: ICD-10-CM

## 2022-09-30 DIAGNOSIS — M79.671 PAIN IN RIGHT FOOT: ICD-10-CM

## 2022-09-30 DIAGNOSIS — M79.89 OTHER SPECIFIED SOFT TISSUE DISORDERS: ICD-10-CM

## 2022-09-30 DIAGNOSIS — M79.672 PAIN IN RIGHT FOOT: ICD-10-CM

## 2022-09-30 PROCEDURE — 99214 OFFICE O/P EST MOD 30 MIN: CPT

## 2022-09-30 NOTE — HISTORY OF PRESENT ILLNESS
[FreeTextEntry1] : 9/30/2022: The patient returns to the office with her daughter to review MRI results of the bilateral feet. She reports to have continued pain, unchanged from her last evaluation. There have been no significant changes since the previous visit. She is wearing sandals and is walking without assistance. No other complaints. \par \par 7/27/22: The patient is here for follow-up of bilateral foot pain.  The patient continues to have heel pain at the plantar fascia insertion for both feet.  She states that both feet are equally painful.  Since her last office visit in April, the patient has done 6 weeks of physical therapy which has not helped with the pains in her feet.  She has tried changing her shoewear which has not helped.  She continues to walk with a limp due to the pain in both feet, plantar fascia.  No numbness or tingling in the foot or ankle.  No other complaints.\par \par At this time the patient has failed conservative management for the past 6 weeks which also included anti-inflammatories.

## 2022-09-30 NOTE — ADDENDUM
[FreeTextEntry1] : I, Yanira Kwan, acted solely as a scribe for Dr. Adonis Gee on this date 09/30/2022.\par \par All medical record entries made by the Scribe were at my, Dr. Adonis Gee, direction and personally dictated by me on 09/30/2022. I have reviewed the chart and agree that the record accurately reflects my personal performance of the history, physical exam, assessment and plan. I have also personally directed, reviewed, and agreed with the chart.	\par

## 2022-09-30 NOTE — PHYSICAL EXAM
[de-identified] : General: Alert and oriented x3. In no acute distress. Pleasant in nature with a normal affect. No apparent respiratory distress.\par \par Bilateral Foot Exam\par Skin: Clean, dry, intact\par Inspection: No obvious malalignment, no masses, + swelling, no effusion\par Pulses: 2+ DP/PT pulses\par ROM: FOOT Full  ROM of digits, ANKLE 10 degrees of dorsiflexion, 40 degrees of plantarflexion, 10 degrees of subtalar motion.\par Painful ROM: None\par Tenderness: No tenderness over the medial malleolus, No tenderness over the lateral malleolus, no CFL/ATFL/PTFL pain, no deltoid ligament pain. No heel pain. No Achilles tenderness. No 5th metatarsal pain. No pain to the LisFranc joint. No ttp over the posterior tibial tendon.\par Stability: Negative anterior/posterior drawer.\par Strength: 5/5 ADD/ABD/TA/GS/EHL/FHL/EDL\par Neuro: Sensation in tact to light touch throughout\par Additional tests: Negative Mortons test, negative tarsal tunnel tinels, negative single heel rise.	 [de-identified] : EXAM: [id number] - MR FOOT LT  - ORDERED BY:  SYDNIE ALAMO\par \par PROCEDURE DATE:  08/09/2022\par \par \par \par INTERPRETATION:  EXAMINATION: MR FOOT LEFT\par \par CLINICAL INDICATION: Hindfoot pain. Evaluate for plantar fascia ligament tearing.\par \par COMPARISON: None.\par \par TECHNIQUE: Multiplanar, multi-sequence MRI of the left foot was performed without intravenous contrast.\par \par INTERPRETATION:\par \par Localizer: No additional findings.\par \par Bones: There is no fracture.  There is no marrow replacing lesion.\par \par Soft Tissues: There is mild pre-Achilles bursitis. There is no evidence of tendon tear. There is peroneal tenosynovitis. There is scar remodeling of ATFL, likely related to remote sprain.\par \par Joint space: There are small tibiotalar/subtalar effusions.\par \par Plantar fascia: The plantar fascia is normal in signal and thickness.\par \par Other findings: There is trace tenosynovitis of the tibialis posterior tendon.\par \par IMPRESSION:\par 1.  Mild pre-Achilles bursitis.\par \par 2.  Edema within the calcaneal fat pad, nonspecific. No evidence of planter fasciitis.\par \par 3.  Evidence of remote sprain of ATFL.\par \par 4.  Trace peroneal tenosynovitis. No tendon tear.\par \par 5.  Small tibiotalar and subtalar effusions.\par \par --- End of Report ---\par \par \par IRENE REYES MD; Resident Radiologist\par This document has been electronically signed.\par SHLOMIT A GOLDBERG-STEIN MD; Attending Radiologist\par This document has been electronically signed. Aug 16 2022 12:08PM\par \par \par \par EXAM: [id number] - MR FOOT RT  - ORDERED BY: SYDNIE ALAMO\par \par PROCEDURE DATE:  08/09/2022\par \par \par \par INTERPRETATION:  EXAMINATION: MR FOOT RIGHT\par \par CLINICAL INDICATION: Hind foot pain. Eval plantar fascia ligament tearing.\par \par COMPARISON: None.\par \par TECHNIQUE: Multiplanar, multi-sequence MRI of the right foot was performed without intravenous contrast.\par \par INTERPRETATION:\par \par Localizer: No additional findings.\par \par Bones: There is no fracture.  There is no marrow replacing lesion.\par \par Soft Tissues: There is mild pre-Achilles bursitis. There is no evidence of tendon tear. There is trace tibialis posterior and flexor digitorum tenosynovitis. There is scar remodeling of ATFL and deltoid ligaments, likely related to remote sprain.\par \par Joint space: There are small tibiotalar/subtalar effusions.\par \par Plantar fascia: The plantar fascia is normal in signal and thickness.\par \par Other findings: There is trace tenosynovitis of the tibialis posterior tendon.\par \par IMPRESSION:\par 1.  Mild pre-Achilles bursitis.\par \par 2.  Edema within the calcaneal fat pad, nonspecific. No evidence of planter fasciitis.\par \par 3.  Evidence of remote sprains of ATFL and deltoid ligament.\par \par 4.  Trace tibialis posterior and flexor digitorum longus tenosynovitis. No tendon tear.\par \par 5.  Small tibiotalar and subtalar effusions.\par \par --- End of Report ---\par \par \par IRENE REYES MD; Resident Radiologist\par This document has been electronically signed.\par SHLOMIT A GOLDBERG-STEIN MD; Attending Radiologist\par This document has been electronically signed. Aug 16 2022 12:06PM

## 2022-09-30 NOTE — DISCUSSION/SUMMARY
[de-identified] : Today I had a lengthy discussion with the patient regarding their bilateral foot pain. I have addressed all the patient's concerns surrounding the pathology of their condition. MRI results were reviewed with the patient today in the office. I recommend the patient undergo a course of physical therapy for the bilateral foot  2-3 times a week for a total of 8-12 weeks. A prescription was given for the physical therapy today. I recommend that the patient utilize ice, NSAIDs, and heat PRN. They can also elevate their foot above the level of the heart. I recommend that the patient utilize Voltaren gel topically. If the Voltaren gel could not be obtained, Icy Hot, Biofreeze, or Bengay can be utilized instead.		\par \par \par The patient understood and verbally agreed to the treatment plan. All of their questions were answered and they were satisfied with the visit. The patient should call the office if they have any questions or experience worsening symptoms. I would like to see the patient back in the office in [place] to reassess their condition. 				\par

## 2022-12-22 ENCOUNTER — APPOINTMENT (OUTPATIENT)
Dept: ENDOCRINOLOGY | Facility: CLINIC | Age: 52
End: 2022-12-22

## 2023-02-14 ENCOUNTER — NON-APPOINTMENT (OUTPATIENT)
Age: 53
End: 2023-02-14

## 2023-02-14 ENCOUNTER — APPOINTMENT (OUTPATIENT)
Dept: GASTROENTEROLOGY | Facility: CLINIC | Age: 53
End: 2023-02-14
Payer: MEDICAID

## 2023-02-14 VITALS
RESPIRATION RATE: 16 BRPM | SYSTOLIC BLOOD PRESSURE: 108 MMHG | OXYGEN SATURATION: 97 % | HEIGHT: 64 IN | DIASTOLIC BLOOD PRESSURE: 72 MMHG | HEART RATE: 80 BPM | BODY MASS INDEX: 26.46 KG/M2 | WEIGHT: 155 LBS

## 2023-02-14 PROCEDURE — 99203 OFFICE O/P NEW LOW 30 MIN: CPT

## 2023-02-14 RX ORDER — FEXOFENADINE HYDROCHLORIDE 180 MG/1
180 TABLET ORAL DAILY
Qty: 90 | Refills: 3 | Status: DISCONTINUED | COMMUNITY
Start: 2021-09-03 | End: 2023-02-14

## 2023-02-14 RX ORDER — ALBUTEROL SULFATE 90 UG/1
108 (90 BASE) INHALANT RESPIRATORY (INHALATION)
Qty: 1 | Refills: 2 | Status: DISCONTINUED | COMMUNITY
Start: 2021-09-03 | End: 2023-02-14
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
## 2023-02-14 NOTE — PHYSICAL EXAM
[Alert] : alert [Normal Voice/Communication] : normal voice/communication [No Acute Distress] : no acute distress [Sclera] : the sclera and conjunctiva were normal [Hearing Threshold Finger Rub Not [place]] : hearing was normal [Normal Lips/Gums] : the lips and gums were normal [Oropharynx] : the oropharynx was normal [Normal Appearance] : the appearance of the neck was normal [No Neck Mass] : no neck mass was observed [No Respiratory Distress] : no respiratory distress [No Acc Muscle Use] : no accessory muscle use [Respiration, Rhythm And Depth] : normal respiratory rhythm and effort [Auscultation Breath Sounds / Voice Sounds] : lungs were clear to auscultation bilaterally [Heart Rate And Rhythm] : heart rate was normal and rhythm regular [None] : no edema [Bowel Sounds] : normal bowel sounds [Abdomen Tenderness] : non-tender [No Masses] : no abdominal mass palpated [Abdomen Soft] : soft [Cervical Lymph Nodes Enlarged Posterior Bilaterally] : no posterior cervical lymphadenopathy [Supraclavicular Lymph Nodes Enlarged Bilaterally] : no supraclavicular lymphadenopathy [Cervical Lymph Nodes Enlarged Anterior Bilaterally] : no anterior cervical lymphadenopathy [No CVA Tenderness] : no CVA  tenderness [No Spinal Tenderness] : no spinal tenderness [Abnormal Walk] : normal gait [Normal Color / Pigmentation] : normal skin color and pigmentation [No Focal Deficits] : no focal deficits [Oriented To Time, Place, And Person] : oriented to person, place, and time

## 2023-02-14 NOTE — REASON FOR VISIT
[Consultation] : a consultation visit [Family Member] : family member [FreeTextEntry3] : [place]  [TWNoteComboBox1] : False

## 2023-02-14 NOTE — REVIEW OF SYSTEMS
[facility] [Fever] : no fever [Chills] : no chills [Feeling Poorly] : not feeling poorly [Feeling Tired] : not feeling tired [Recent Weight Loss (___ Lbs)] : no recent weight loss [Abdominal Pain] : no abdominal pain [Vomiting] : no vomiting [Constipation] : no constipation [Diarrhea] : no diarrhea [Heartburn] : heartburn [Melena (black stool)] : no melena [Bleeding] : no bleeding [Bloating (gassiness)] : bloating [Negative] : Heme/Lymph

## 2023-03-30 NOTE — ED ADULT NURSE NOTE - NSIMPLEMENTINTERV_GEN_ALL_ED
yes
Implemented All Universal Safety Interventions:  [place] to call system. Call bell, personal items and telephone within reach. Instruct patient to call for assistance. Room bathroom lighting operational. Non-slip footwear when patient is off stretcher. Physically safe environment: no spills, clutter or unnecessary equipment. Stretcher in lowest position, wheels locked, appropriate side rails in place.

## 2023-06-05 ENCOUNTER — TRANSCRIPTION ENCOUNTER (OUTPATIENT)
Age: 53
End: 2023-06-05

## 2023-06-06 ENCOUNTER — RESULT REVIEW (OUTPATIENT)
Age: 53
End: 2023-06-06

## 2023-06-06 ENCOUNTER — APPOINTMENT (OUTPATIENT)
Dept: GASTROENTEROLOGY | Facility: GI CENTER | Age: 53
End: 2023-06-06
Payer: MEDICAID

## 2023-06-06 ENCOUNTER — OUTPATIENT (OUTPATIENT)
Dept: OUTPATIENT SERVICES | Facility: HOSPITAL | Age: 53
LOS: 1 days | End: 2023-06-06
Payer: COMMERCIAL

## 2023-06-06 DIAGNOSIS — K21.9 GASTRO-ESOPHAGEAL REFLUX DISEASE WITHOUT ESOPHAGITIS: ICD-10-CM

## 2023-06-06 DIAGNOSIS — K64.9 UNSPECIFIED HEMORRHOIDS: ICD-10-CM

## 2023-06-06 DIAGNOSIS — K21.9 GASTRO-ESOPHAGEAL REFLUX DISEASE W/OUT ESOPHAGITIS: ICD-10-CM

## 2023-06-06 DIAGNOSIS — Z12.11 ENCOUNTER FOR SCREENING FOR MALIGNANT NEOPLASM OF COLON: ICD-10-CM

## 2023-06-06 DIAGNOSIS — K22.10 ULCER OF ESOPHAGUS W/OUT BLEEDING: ICD-10-CM

## 2023-06-06 PROCEDURE — 43239 EGD BIOPSY SINGLE/MULTIPLE: CPT

## 2023-06-06 PROCEDURE — 43239 EGD BIOPSY SINGLE/MULTIPLE: CPT | Mod: 59

## 2023-06-06 PROCEDURE — G0121: CPT

## 2023-06-06 PROCEDURE — 88305 TISSUE EXAM BY PATHOLOGIST: CPT

## 2023-06-06 PROCEDURE — 88305 TISSUE EXAM BY PATHOLOGIST: CPT | Mod: 26

## 2023-06-06 PROCEDURE — 45378 DIAGNOSTIC COLONOSCOPY: CPT | Mod: 33

## 2023-06-06 PROCEDURE — 88342 IMHCHEM/IMCYTCHM 1ST ANTB: CPT | Mod: 26

## 2023-06-06 PROCEDURE — 88342 IMHCHEM/IMCYTCHM 1ST ANTB: CPT

## 2023-06-06 NOTE — PHYSICAL EXAM
[Alert] : alert [Normal Voice/Communication] : normal voice/communication [No Acute Distress] : no acute distress [Sclera] : the sclera and conjunctiva were normal [Hearing Threshold Finger Rub Not [place]] : hearing was normal [Normal Lips/Gums] : the lips and gums were normal [Oropharynx] : the oropharynx was normal [Normal Appearance] : the appearance of the neck was normal [No Neck Mass] : no neck mass was observed [No Respiratory Distress] : no respiratory distress [No Acc Muscle Use] : no accessory muscle use [Respiration, Rhythm And Depth] : normal respiratory rhythm and effort [Auscultation Breath Sounds / Voice Sounds] : lungs were clear to auscultation bilaterally [Heart Rate And Rhythm] : heart rate was normal and rhythm regular [None] : no edema [Bowel Sounds] : normal bowel sounds [Abdomen Tenderness] : non-tender [No Masses] : no abdominal mass palpated [Abdomen Soft] : soft [Cervical Lymph Nodes Enlarged Posterior Bilaterally] : no posterior cervical lymphadenopathy [Supraclavicular Lymph Nodes Enlarged Bilaterally] : no supraclavicular lymphadenopathy [Cervical Lymph Nodes Enlarged Anterior Bilaterally] : no anterior cervical lymphadenopathy [No CVA Tenderness] : no CVA  tenderness [No Spinal Tenderness] : no spinal tenderness [Abnormal Walk] : normal gait [Normal Color / Pigmentation] : normal skin color and pigmentation [No Focal Deficits] : no focal deficits [Oriented To Time, Place, And Person] : oriented to person, place, and time

## 2023-06-06 NOTE — ASSESSMENT
[FreeTextEntry1] : Procedure risks and benefits reviewed with patient\par See anesthesia note for ASA and Mallampati

## 2023-06-06 NOTE — REVIEW OF SYSTEMS
[Fever] : no fever [Chills] : no chills [Feeling Tired] : not feeling tired [Feeling Poorly] : not feeling poorly [Recent Weight Loss (___ Lbs)] : no recent weight loss [As Noted in HPI] : as noted in HPI [Abdominal Pain] : no abdominal pain [Vomiting] : no vomiting [Constipation] : no constipation [Diarrhea] : no diarrhea [Heartburn] : heartburn [Melena (black stool)] : no melena [Swollen Glands] : no swollen glands [Negative] : Heme/Lymph

## 2023-06-12 LAB — SURGICAL PATHOLOGY STUDY: SIGNIFICANT CHANGE UP

## 2023-06-15 DIAGNOSIS — K31.9 DISEASE OF STOMACH AND DUODENUM, UNSPECIFIED: ICD-10-CM

## 2023-06-19 ENCOUNTER — RX RENEWAL (OUTPATIENT)
Age: 53
End: 2023-06-19

## 2023-06-19 RX ORDER — PANTOPRAZOLE 40 MG/1
40 TABLET, DELAYED RELEASE ORAL DAILY
Qty: 30 | Refills: 3 | Status: ACTIVE | COMMUNITY
Start: 2023-02-14 | End: 1900-01-01

## 2023-07-03 ENCOUNTER — LABORATORY RESULT (OUTPATIENT)
Age: 53
End: 2023-07-03

## 2023-07-12 ENCOUNTER — NON-APPOINTMENT (OUTPATIENT)
Age: 53
End: 2023-07-12

## 2023-07-12 LAB
CELIAC DISEASE INTERPRETATION: NORMAL
CELIAC GENE PAIRS PRESENT: NORMAL
DQ ALPHA 1: NORMAL
DQ BETA 1: NORMAL
IGA SER QL IEP: 148 MG/DL
IMMUNOGLOBULIN A (IGA): 162 MG/DL
TTG IGA SER IA-ACNC: <1.2 U/ML
TTG IGA SER-ACNC: NEGATIVE
TTG IGG SER IA-ACNC: 1.2 U/ML
TTG IGG SER IA-ACNC: NEGATIVE

## 2023-08-24 ENCOUNTER — NON-APPOINTMENT (OUTPATIENT)
Age: 53
End: 2023-08-24

## 2023-10-17 ENCOUNTER — APPOINTMENT (OUTPATIENT)
Dept: BREAST CENTER | Facility: CLINIC | Age: 53
End: 2023-10-17
Payer: MEDICAID

## 2023-10-17 VITALS
DIASTOLIC BLOOD PRESSURE: 67 MMHG | WEIGHT: 163 LBS | TEMPERATURE: 97.3 F | HEIGHT: 65 IN | SYSTOLIC BLOOD PRESSURE: 109 MMHG | BODY MASS INDEX: 27.16 KG/M2 | HEART RATE: 68 BPM

## 2023-10-17 PROCEDURE — 99204 OFFICE O/P NEW MOD 45 MIN: CPT

## 2023-10-17 RX ORDER — ACETAMINOPHEN 500 MG/1
TABLET ORAL
Refills: 0 | Status: DISCONTINUED | COMMUNITY
End: 2023-10-17

## 2023-10-17 RX ORDER — POLYETHYLENE GLYCOL 3350 AND ELECTROLYTES WITH LEMON FLAVOR 236; 22.74; 6.74; 5.86; 2.97 G/4L; G/4L; G/4L; G/4L; G/4L
236 POWDER, FOR SOLUTION ORAL
Qty: 1 | Refills: 0 | Status: DISCONTINUED | COMMUNITY
Start: 2023-02-14 | End: 2023-10-17

## 2023-10-17 RX ORDER — FAMOTIDINE 20 MG/1
20 TABLET, FILM COATED ORAL
Qty: 30 | Refills: 2 | Status: DISCONTINUED | COMMUNITY
Start: 2020-11-23 | End: 2023-10-17

## 2023-10-17 RX ORDER — FLUTICASONE PROPIONATE 50 UG/1
50 SPRAY, METERED NASAL
Qty: 1 | Refills: 0 | Status: DISCONTINUED | COMMUNITY
Start: 2020-11-23 | End: 2023-10-17

## 2023-10-25 ENCOUNTER — APPOINTMENT (OUTPATIENT)
Dept: PLASTIC SURGERY | Facility: CLINIC | Age: 53
End: 2023-10-25
Payer: MEDICAID

## 2023-10-25 ENCOUNTER — NON-APPOINTMENT (OUTPATIENT)
Age: 53
End: 2023-10-25

## 2023-10-25 VITALS
BODY MASS INDEX: 27.16 KG/M2 | DIASTOLIC BLOOD PRESSURE: 70 MMHG | SYSTOLIC BLOOD PRESSURE: 110 MMHG | HEART RATE: 67 BPM | HEIGHT: 65 IN | OXYGEN SATURATION: 98 % | RESPIRATION RATE: 16 BRPM | TEMPERATURE: 98.5 F | WEIGHT: 163 LBS

## 2023-10-25 DIAGNOSIS — Z86.2 PERSONAL HISTORY OF DISEASES OF THE BLOOD AND BLOOD-FORMING ORGANS AND CERTAIN DISORDERS INVOLVING THE IMMUNE MECHANISM: ICD-10-CM

## 2023-10-25 DIAGNOSIS — Z42.1 ENCOUNTER FOR BREAST RECONSTRUCTION FOLLOWING MASTECTOMY: ICD-10-CM

## 2023-10-25 DIAGNOSIS — Z80.6 FAMILY HISTORY OF LEUKEMIA: ICD-10-CM

## 2023-10-25 DIAGNOSIS — Z80.0 FAMILY HISTORY OF MALIGNANT NEOPLASM OF DIGESTIVE ORGANS: ICD-10-CM

## 2023-10-25 DIAGNOSIS — H26.9 UNSPECIFIED CATARACT: ICD-10-CM

## 2023-10-25 PROCEDURE — 99204 OFFICE O/P NEW MOD 45 MIN: CPT

## 2023-10-25 RX ORDER — LORATADINE 10 MG/1
10 TABLET ORAL
Refills: 0 | Status: ACTIVE | COMMUNITY

## 2023-10-25 RX ORDER — IPRATROPIUM BROMIDE 21 UG/1
0.03 SPRAY NASAL
Refills: 0 | Status: ACTIVE | COMMUNITY

## 2023-10-25 RX ORDER — FLUTICASONE PROPIONATE 50 UG/1
50 SPRAY, METERED NASAL
Refills: 0 | Status: ACTIVE | COMMUNITY

## 2023-10-27 ENCOUNTER — NON-APPOINTMENT (OUTPATIENT)
Age: 53
End: 2023-10-27

## 2023-11-01 ENCOUNTER — APPOINTMENT (OUTPATIENT)
Dept: MRI IMAGING | Facility: CLINIC | Age: 53
End: 2023-11-01
Payer: MEDICAID

## 2023-11-01 PROCEDURE — 77049 MRI BREAST C-+ W/CAD BI: CPT

## 2023-11-01 PROCEDURE — A9585: CPT

## 2023-11-02 ENCOUNTER — NON-APPOINTMENT (OUTPATIENT)
Age: 53
End: 2023-11-02

## 2023-11-03 ENCOUNTER — OUTPATIENT (OUTPATIENT)
Dept: OUTPATIENT SERVICES | Facility: HOSPITAL | Age: 53
LOS: 1 days | End: 2023-11-03
Payer: COMMERCIAL

## 2023-11-03 DIAGNOSIS — C50.919 MALIGNANT NEOPLASM OF UNSPECIFIED SITE OF UNSPECIFIED FEMALE BREAST: ICD-10-CM

## 2023-11-07 ENCOUNTER — NON-APPOINTMENT (OUTPATIENT)
Age: 53
End: 2023-11-07

## 2023-11-09 ENCOUNTER — RESULT REVIEW (OUTPATIENT)
Age: 53
End: 2023-11-09

## 2023-11-09 ENCOUNTER — APPOINTMENT (OUTPATIENT)
Dept: HEMATOLOGY ONCOLOGY | Facility: CLINIC | Age: 53
End: 2023-11-09
Payer: MEDICAID

## 2023-11-09 VITALS
TEMPERATURE: 98.1 F | WEIGHT: 164.8 LBS | BODY MASS INDEX: 27.46 KG/M2 | HEART RATE: 71 BPM | OXYGEN SATURATION: 97 % | HEIGHT: 65 IN | SYSTOLIC BLOOD PRESSURE: 112 MMHG | DIASTOLIC BLOOD PRESSURE: 69 MMHG

## 2023-11-09 DIAGNOSIS — Z91.89 OTHER SPECIFIED PERSONAL RISK FACTORS, NOT ELSEWHERE CLASSIFIED: ICD-10-CM

## 2023-11-09 LAB
BASOPHILS # BLD AUTO: 0.06 K/UL — SIGNIFICANT CHANGE UP (ref 0–0.2)
BASOPHILS # BLD AUTO: 0.06 K/UL — SIGNIFICANT CHANGE UP (ref 0–0.2)
BASOPHILS NFR BLD AUTO: 0.5 % — SIGNIFICANT CHANGE UP (ref 0–2)
BASOPHILS NFR BLD AUTO: 0.5 % — SIGNIFICANT CHANGE UP (ref 0–2)
EOSINOPHIL # BLD AUTO: 0.65 K/UL — HIGH (ref 0–0.5)
EOSINOPHIL # BLD AUTO: 0.65 K/UL — HIGH (ref 0–0.5)
EOSINOPHIL NFR BLD AUTO: 5.7 % — SIGNIFICANT CHANGE UP (ref 0–6)
EOSINOPHIL NFR BLD AUTO: 5.7 % — SIGNIFICANT CHANGE UP (ref 0–6)
HCT VFR BLD CALC: 41.2 % — SIGNIFICANT CHANGE UP (ref 34.5–45)
HCT VFR BLD CALC: 41.2 % — SIGNIFICANT CHANGE UP (ref 34.5–45)
HGB BLD-MCNC: 13.6 G/DL — SIGNIFICANT CHANGE UP (ref 11.5–15.5)
HGB BLD-MCNC: 13.6 G/DL — SIGNIFICANT CHANGE UP (ref 11.5–15.5)
IMM GRANULOCYTES NFR BLD AUTO: 0.7 % — SIGNIFICANT CHANGE UP (ref 0–0.9)
IMM GRANULOCYTES NFR BLD AUTO: 0.7 % — SIGNIFICANT CHANGE UP (ref 0–0.9)
LYMPHOCYTES # BLD AUTO: 2.66 K/UL — SIGNIFICANT CHANGE UP (ref 1–3.3)
LYMPHOCYTES # BLD AUTO: 2.66 K/UL — SIGNIFICANT CHANGE UP (ref 1–3.3)
LYMPHOCYTES # BLD AUTO: 23.4 % — SIGNIFICANT CHANGE UP (ref 13–44)
LYMPHOCYTES # BLD AUTO: 23.4 % — SIGNIFICANT CHANGE UP (ref 13–44)
MCHC RBC-ENTMCNC: 28.3 PG — SIGNIFICANT CHANGE UP (ref 27–34)
MCHC RBC-ENTMCNC: 28.3 PG — SIGNIFICANT CHANGE UP (ref 27–34)
MCHC RBC-ENTMCNC: 33 GM/DL — SIGNIFICANT CHANGE UP (ref 32–36)
MCHC RBC-ENTMCNC: 33 GM/DL — SIGNIFICANT CHANGE UP (ref 32–36)
MCV RBC AUTO: 85.7 FL — SIGNIFICANT CHANGE UP (ref 80–100)
MCV RBC AUTO: 85.7 FL — SIGNIFICANT CHANGE UP (ref 80–100)
MONOCYTES # BLD AUTO: 0.66 K/UL — SIGNIFICANT CHANGE UP (ref 0–0.9)
MONOCYTES # BLD AUTO: 0.66 K/UL — SIGNIFICANT CHANGE UP (ref 0–0.9)
MONOCYTES NFR BLD AUTO: 5.8 % — SIGNIFICANT CHANGE UP (ref 2–14)
MONOCYTES NFR BLD AUTO: 5.8 % — SIGNIFICANT CHANGE UP (ref 2–14)
NEUTROPHILS # BLD AUTO: 7.27 K/UL — SIGNIFICANT CHANGE UP (ref 1.8–7.4)
NEUTROPHILS # BLD AUTO: 7.27 K/UL — SIGNIFICANT CHANGE UP (ref 1.8–7.4)
NEUTROPHILS NFR BLD AUTO: 63.9 % — SIGNIFICANT CHANGE UP (ref 43–77)
NEUTROPHILS NFR BLD AUTO: 63.9 % — SIGNIFICANT CHANGE UP (ref 43–77)
NRBC # BLD: 0 /100 WBCS — SIGNIFICANT CHANGE UP (ref 0–0)
NRBC # BLD: 0 /100 WBCS — SIGNIFICANT CHANGE UP (ref 0–0)
PLATELET # BLD AUTO: 225 K/UL — SIGNIFICANT CHANGE UP (ref 150–400)
PLATELET # BLD AUTO: 225 K/UL — SIGNIFICANT CHANGE UP (ref 150–400)
RBC # BLD: 4.81 M/UL — SIGNIFICANT CHANGE UP (ref 3.8–5.2)
RBC # BLD: 4.81 M/UL — SIGNIFICANT CHANGE UP (ref 3.8–5.2)
RBC # FLD: 13.8 % — SIGNIFICANT CHANGE UP (ref 10.3–14.5)
RBC # FLD: 13.8 % — SIGNIFICANT CHANGE UP (ref 10.3–14.5)
WBC # BLD: 11.38 K/UL — HIGH (ref 3.8–10.5)
WBC # BLD: 11.38 K/UL — HIGH (ref 3.8–10.5)
WBC # FLD AUTO: 11.38 K/UL — HIGH (ref 3.8–10.5)
WBC # FLD AUTO: 11.38 K/UL — HIGH (ref 3.8–10.5)

## 2023-11-09 PROCEDURE — 85027 COMPLETE CBC AUTOMATED: CPT

## 2023-11-09 PROCEDURE — 99205 OFFICE O/P NEW HI 60 MIN: CPT

## 2023-11-09 RX ORDER — LEVONORGESTREL 52 MG/1
INTRAUTERINE DEVICE INTRAUTERINE
Refills: 0 | Status: DISCONTINUED | COMMUNITY
End: 2023-11-09

## 2023-11-10 ENCOUNTER — RESULT REVIEW (OUTPATIENT)
Age: 53
End: 2023-11-10

## 2023-11-10 ENCOUNTER — APPOINTMENT (OUTPATIENT)
Dept: PLASTIC SURGERY | Facility: HOSPITAL | Age: 53
End: 2023-11-10
Payer: MEDICAID

## 2023-11-10 ENCOUNTER — APPOINTMENT (OUTPATIENT)
Dept: BREAST CENTER | Facility: HOSPITAL | Age: 53
End: 2023-11-10

## 2023-11-10 ENCOUNTER — NON-APPOINTMENT (OUTPATIENT)
Age: 53
End: 2023-11-10

## 2023-11-10 LAB
25(OH)D3 SERPL-MCNC: 30.6 NG/ML
ALBUMIN SERPL ELPH-MCNC: 4.7 G/DL
ALP BLD-CCNC: 57 U/L
ALT SERPL-CCNC: 17 U/L
ANION GAP SERPL CALC-SCNC: 14 MMOL/L
AST SERPL-CCNC: 16 U/L
BILIRUB SERPL-MCNC: 0.3 MG/DL
BUN SERPL-MCNC: 11 MG/DL
CALCIUM SERPL-MCNC: 9.6 MG/DL
CHLORIDE SERPL-SCNC: 102 MMOL/L
CO2 SERPL-SCNC: 24 MMOL/L
CREAT SERPL-MCNC: 0.79 MG/DL
EGFR: 90 ML/MIN/1.73M2
ESTRADIOL SERPL-MCNC: 278 PG/ML
FERRITIN SERPL-MCNC: 44 NG/ML
FOLATE SERPL-MCNC: 10.6 NG/ML
FSH SERPL-MCNC: 2.4 IU/L
GLUCOSE SERPL-MCNC: 101 MG/DL
HCG SERPL-MCNC: <1 MIU/ML
IRON SATN MFR SERPL: 25 %
IRON SERPL-MCNC: 80 UG/DL
LH SERPL-ACNC: 2.6 IU/L
POTASSIUM SERPL-SCNC: 4.3 MMOL/L
PROT SERPL-MCNC: 7.4 G/DL
SODIUM SERPL-SCNC: 139 MMOL/L
TIBC SERPL-MCNC: 322 UG/DL
UIBC SERPL-MCNC: 242 UG/DL
VIT B12 SERPL-MCNC: 307 PG/ML

## 2023-11-10 PROCEDURE — 19318 BREAST REDUCTION: CPT | Mod: RT

## 2023-11-13 ENCOUNTER — RESULT REVIEW (OUTPATIENT)
Age: 53
End: 2023-11-13

## 2023-11-22 ENCOUNTER — APPOINTMENT (OUTPATIENT)
Dept: PLASTIC SURGERY | Facility: CLINIC | Age: 53
End: 2023-11-22
Payer: MEDICAID

## 2023-11-22 VITALS
DIASTOLIC BLOOD PRESSURE: 58 MMHG | WEIGHT: 161 LBS | HEIGHT: 65 IN | HEART RATE: 65 BPM | SYSTOLIC BLOOD PRESSURE: 102 MMHG | OXYGEN SATURATION: 98 % | BODY MASS INDEX: 26.82 KG/M2 | TEMPERATURE: 98.4 F

## 2023-11-22 PROCEDURE — 99024 POSTOP FOLLOW-UP VISIT: CPT

## 2023-11-29 ENCOUNTER — APPOINTMENT (OUTPATIENT)
Dept: BREAST CENTER | Facility: CLINIC | Age: 53
End: 2023-11-29
Payer: MEDICAID

## 2023-11-29 VITALS
HEIGHT: 65 IN | DIASTOLIC BLOOD PRESSURE: 62 MMHG | BODY MASS INDEX: 26.82 KG/M2 | WEIGHT: 161 LBS | TEMPERATURE: 97.9 F | HEART RATE: 61 BPM | SYSTOLIC BLOOD PRESSURE: 96 MMHG

## 2023-11-29 PROCEDURE — 99024 POSTOP FOLLOW-UP VISIT: CPT

## 2023-11-30 ENCOUNTER — NON-APPOINTMENT (OUTPATIENT)
Age: 53
End: 2023-11-30

## 2023-12-01 ENCOUNTER — APPOINTMENT (OUTPATIENT)
Dept: PLASTIC SURGERY | Facility: CLINIC | Age: 53
End: 2023-12-01
Payer: MEDICAID

## 2023-12-01 VITALS
RESPIRATION RATE: 16 BRPM | DIASTOLIC BLOOD PRESSURE: 68 MMHG | BODY MASS INDEX: 26.82 KG/M2 | TEMPERATURE: 98.3 F | SYSTOLIC BLOOD PRESSURE: 112 MMHG | WEIGHT: 161 LBS | HEIGHT: 65 IN | HEART RATE: 68 BPM | OXYGEN SATURATION: 99 %

## 2023-12-01 PROCEDURE — 99024 POSTOP FOLLOW-UP VISIT: CPT

## 2023-12-04 ENCOUNTER — NON-APPOINTMENT (OUTPATIENT)
Age: 53
End: 2023-12-04

## 2023-12-06 ENCOUNTER — APPOINTMENT (OUTPATIENT)
Dept: PLASTIC SURGERY | Facility: CLINIC | Age: 53
End: 2023-12-06

## 2023-12-13 ENCOUNTER — NON-APPOINTMENT (OUTPATIENT)
Age: 53
End: 2023-12-13

## 2023-12-14 ENCOUNTER — APPOINTMENT (OUTPATIENT)
Dept: HEMATOLOGY ONCOLOGY | Facility: CLINIC | Age: 53
End: 2023-12-14
Payer: MEDICAID

## 2023-12-14 VITALS
SYSTOLIC BLOOD PRESSURE: 119 MMHG | TEMPERATURE: 98.2 F | WEIGHT: 159 LBS | DIASTOLIC BLOOD PRESSURE: 77 MMHG | HEIGHT: 65 IN | OXYGEN SATURATION: 100 % | BODY MASS INDEX: 26.49 KG/M2 | HEART RATE: 63 BPM

## 2023-12-14 PROCEDURE — 99214 OFFICE O/P EST MOD 30 MIN: CPT

## 2023-12-14 NOTE — PHYSICAL EXAM
"-- Message is from the [email or web address]--    Reason for [address] Megan Mcclain stated this message i regarding feeding pump she is having problems with it and the DME provider [facility] please call to assist    Caller Information       Type Contact Phone    12/19/2018 10:38 AM Phone (Incoming) coby  (Mother)           Alternative phone number: [phone] wont be available until 12:30 - 2:00P.M or after 4:30     Turnaround time given to caller: ""This message will be sent to [facility] Provider's name]. The clinical team will fulfill your request as soon as they review your message. \""    " [Fully active, able to carry on all pre-disease performance without restriction] : Status 0 - Fully active, able to carry on all pre-disease performance without restriction [Normal] : affect appropriate [de-identified] : generally well appearing female, NAD, pleasant  [de-identified] : s/p R lumpectomy w/ L oncoplastics, generally healing well, very small areas of opening of her incisions

## 2023-12-14 NOTE — HISTORY OF PRESENT ILLNESS
[de-identified] : Referred by:  Dr. Carlyn Walker (breast surgery) PCP:  Corrie De Dios Diagnosis:  Right breast DCIS Here today w/ her Daughter - Bradford Torres (ph [phone])   Kiran Torres is a pre-menopausal Setswana speaking female who presented at age 53 in 2023 for evaluation of right breast DCIS. The patient has a medical history of GERD, erosive esophagitis, iron deficiency anemia (required po iron, likely from menorrhagia, 2018 ?), ovarian cysts, environmental allergies. Surgical hx: bilateral tubal ligation.  She presents with her daughter who is a nurse at [employer].  Her history dates back to an abnormal screening mammogram on 23 showing markedly dense breast tissue and microcalcifications in the right upper outer breast.  She denied any breast complaints at that time including breast mass, nipple discharge or breast pain. Diagnostic imaging ensued.  She denied breast masses, skin changes, or nipple discharge.  Right breast biopsy 23 confirmed ductal carcinoma in situ, grade 2-3, ER > 91%, PA > 91%.   Breast MRI performed 23 was suggestive of extensive right breast involvement which was confirmed by two subsequent biopsies.  She was evaluated by Dr. Carlyn Walker from breast surgery and Dr. Leigh Garcia from plastic surgery- she is leaning towards right lumpectomy w/ oncoplastics. Surgery tentatively planned for tomorrow 11/10/23.    At today's visit she is feeling well. She denies recent headaches, visual changes, balance issues, CP, cough, SOB, n/v/d, constipation, unintentional weight loss or new bone or back pain. She had her IUD removed 2 weeks ago, still spotting.   Imaging reviewed: Bilateral screening mammogram - 23 @ [facility] - right breast microcalcifications (Birads 0) Diagnostic right mammogram - 23 @ [facility] - segmental branching pleomorphic calcifications noted, stereotactic core biopsy recommended. MRI breast - 23 - biopsy proven right breast malignancy.  The pleomorphic calcifications demonstrate a larger area of involvement (5 x 5.5 cm) than the MRI suggests.  No lymphadenopathy or left breast findings.    Pathology reviewed: Right breast biopsy - 23 - Ductal carcinoma in situ (solid, cribriform, comedo) w/ associated calcifications, intermediate to high nuclear grade, ER %, PA % Right breast biopsies x 2 - 11/3/23 - extensive DCIS w/ intermediate to high nuclear grade   Genetics:  [employer] multi-gene panel testing negative 10/2023.   HCM: - COVID vaccination:  Pfizer x 2, no boosters - Flu shots - doesn't take - Colonoscopy:  2023 - no findings - Gyn:  2023 ? 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  - Living situation:  Lives in [place] w/ spouse, children, and grandson (6 yo) - Smoking/etoh/illicits:  Never smoker, no etoh, no illicits  - Exercise:  none   OB/GYN Hx: - Age of menarche:  14 - Age of menopause:  n/a - irregular bleeding d/t previous Mirena - Pregnancy history:   LC3  - Age at live birth: 20 - OCP use:  none prior  - Fertility treatments:  none - Hormonal therapy:  none prior  - Breast feeding history:  2 yrs - BC - progesterone IUS dc'd 10/2023 (currently abstinent)    FH: - No known family history of cancer - no known AJ ancestry  [de-identified] : Kiran presents on 12/14/23 for follow up for R breast DCIS.   At today's visit she feels unwell- she has been congested and multiple family members are also sick.  She remains pre-menopausal, having monthly menses.  She underwent right breast lumpectomy on 11/10/2023 with Dr. Carlyn Walker. Pathology revealed ductal carcinoma in situ, micropapillary, cribriform and solid with intermediate to high-grade, spanning 30 mm, DCIS present less than 1 mm from inked anterior posterior superior and inferior margins.  Final lateral margin greater than 2 mm for DCIS and additional superior margin was negative. pT1sNx Prior biopsy ER/CO strongly positive   Per Dr. Walker: 3 margins <1 mm, anterior, superior, posterior and inferior on initial lumpectomy. However additional margins were removed superiorly and laterally (which was the anterior margin), and they were negative. The posterior margin is at the pectoralis. Only the inferior margin, would need to be reexcised. Re-excision scheduled for 12/28.   She has met w/ Dr. Fairchild and is pending RT after her re-excision.

## 2023-12-14 NOTE — RESULTS/DATA
[FreeTextEntry1] : #Right breast DCIS- diagnosed on screening mammogram 9/2023  S/p Right breast biopsy 9/29/23 which confirmed ductal carcinoma in situ, grade 2-3, ER > 91%, IN > 91%.    She underwent right breast lumpectomy w/ left oncoplastics on 11/10/2023 with Dr. Carlyn Walker & Dr. Leigh Garcia. Pathology revealed DCIS, grade 3, spanning 30 mm, DCIS present less than 1 mm from inked anterior posterior superior and inferior margins.  Final lateral margin greater than 2 mm for DCIS and additional superior margin was negative. pT1sNx  She is pending re-excision on 12/28.  Pending RT with Dr. Fairchild following re-excision.   At today's visit we discussed management of high risk breast disorders including ADH, ALH, LCIS and DCIS. We discussed her risk of breast abnormalities in the future including additional high risk lesions or invasive breast cancer which is about twice the risk of the average population.  Options for risk reduction were reviewed including full dose tamoxifen (20mg PO daily x 5 years) vs. low dose tamoxifen (5mg PO daily x 3 years), both of which provide ~50% reduction in future breast events. Would not recommend arimidex given that she is pre-menopausal (confirmed on today's labs).  Given her high risk features- large tumor w/ high grade, preference is for full dose tamoxifen.   RTC 2 months for follow up with NP.

## 2023-12-15 ENCOUNTER — NON-APPOINTMENT (OUTPATIENT)
Age: 53
End: 2023-12-15

## 2023-12-28 ENCOUNTER — APPOINTMENT (OUTPATIENT)
Dept: BREAST CENTER | Facility: HOSPITAL | Age: 53
End: 2023-12-28

## 2023-12-28 ENCOUNTER — APPOINTMENT (OUTPATIENT)
Dept: PLASTIC SURGERY | Facility: HOSPITAL | Age: 53
End: 2023-12-28
Payer: MEDICAID

## 2023-12-28 ENCOUNTER — RESULT REVIEW (OUTPATIENT)
Age: 53
End: 2023-12-28

## 2023-12-28 PROCEDURE — 19318 BREAST REDUCTION: CPT | Mod: 50,78

## 2024-01-03 ENCOUNTER — APPOINTMENT (OUTPATIENT)
Dept: PLASTIC SURGERY | Facility: CLINIC | Age: 54
End: 2024-01-03
Payer: MEDICAID

## 2024-01-03 VITALS
DIASTOLIC BLOOD PRESSURE: 68 MMHG | BODY MASS INDEX: 25.66 KG/M2 | SYSTOLIC BLOOD PRESSURE: 116 MMHG | WEIGHT: 154 LBS | TEMPERATURE: 98.1 F | HEART RATE: 89 BPM | HEIGHT: 65 IN | OXYGEN SATURATION: 99 %

## 2024-01-03 PROCEDURE — 99024 POSTOP FOLLOW-UP VISIT: CPT

## 2024-01-03 RX ORDER — OXYCODONE 5 MG/1
5 TABLET ORAL EVERY 6 HOURS
Qty: 5 | Refills: 0 | Status: DISCONTINUED | COMMUNITY
Start: 2023-11-09 | End: 2024-01-03

## 2024-01-03 NOTE — PHYSICAL EXAM
[NI] : Normal [de-identified] : NL respiratory effort noted  [de-identified] : bilateral breasts with steristrips in place No drainage on the pads on either side Mild edema, right greater than left. No significant ecchymosis.

## 2024-01-03 NOTE — ASSESSMENT
[FreeTextEntry1] : Doing very well postop. Just anxious awaiting results. She has an appointment with Dr. Walker on 1/15/24. PA f/u here on 1/17/24.

## 2024-01-03 NOTE — REASON FOR VISIT
[Follow-Up: _____] : a [unfilled] follow-up visit [Family Member] : family member [FreeTextEntry1] : Pt reports tenderness on the right side of her breast, otherwise no changes

## 2024-01-03 NOTE — HISTORY OF PRESENT ILLNESS
[FreeTextEntry1] : Pt reports she is not having any pain at rest, but does have some tenderness when she touches the right IMF. Not taking any pain meds.

## 2024-01-04 ENCOUNTER — NON-APPOINTMENT (OUTPATIENT)
Age: 54
End: 2024-01-04

## 2024-01-12 ENCOUNTER — NON-APPOINTMENT (OUTPATIENT)
Age: 54
End: 2024-01-12

## 2024-01-15 ENCOUNTER — APPOINTMENT (OUTPATIENT)
Dept: BREAST CENTER | Facility: CLINIC | Age: 54
End: 2024-01-15
Payer: MEDICAID

## 2024-01-15 VITALS
HEIGHT: 64 IN | OXYGEN SATURATION: 98 % | BODY MASS INDEX: 26.8 KG/M2 | WEIGHT: 157 LBS | HEART RATE: 66 BPM | SYSTOLIC BLOOD PRESSURE: 118 MMHG | DIASTOLIC BLOOD PRESSURE: 78 MMHG

## 2024-01-15 PROCEDURE — 99024 POSTOP FOLLOW-UP VISIT: CPT

## 2024-01-16 NOTE — CONSULT LETTER
[Dear  ___] : Dear  [unfilled], [Consult Letter:] : I had the pleasure of evaluating your patient, [unfilled]. [Please see my note below.] : Please see my note below. [Consult Closing:] : Thank you very much for allowing me to participate in the care of this patient.  If you have any questions, please do not hesitate to contact me. [Sincerely,] : Sincerely, [FreeTextEntry3] : Carlyn Walker MD

## 2024-01-16 NOTE — ASSESSMENT
[FreeTextEntry1] : PCP: Corrie De Dios MD Patient is a 53-year-old premenopausal, Myriad negative, female here today for postop visit s/p 12/28/23 R breast re excision for close inferior margin. 11/10/23 R lumpectomy and L oncoplastics per Dr. Garcia for right upper outer quadrant DCIS, ER/DE+. Here with her daughter, Mey.  serviced offered, patient declined, requesting daughter translate. 11/10/23 Surgical path: R- DCIS, 30mm, micropapillary, cribriform and solid with intermediate and high grade nuclear atypia and central necrosis. Focally present <1mm from inked inferior, posterior, and anterior tissue edges. Calcs present in DCIS. Uninvolved breast tissue showed apocrine cysts. Unremarkable skin. Bx site change. L- Unremarkable. pTisNx. However additional margins were removed superiorly and laterally (which was the anterior margin), and they were negative. The posterior margin is at the pectoralis. Only the inferior margin, would need to be reexcised. 12/28/23 Surgical path: DCIS Gr2, 3mm. 2mm from closest inked inferior resection margin. Prior bx site change with fat necrosis and fibrosis.  Feels ok postop, has been dizzy, went to urgent care on Saturday, diagnosis with vertigo and started on meclizine without relief. They plan on contacting PCP. Denies hematoma, denies cellulitis.  9/11/23 [facility], bilat sMMG: Markedly dense. R upper outer quadrant segmental microcalcs noted. No susp mmg finding noted in L breast. Impression: R microcalcs. Rec R dMMG. BR0  9/11/23 [facility], bilat US: R- 6:00, 5cmfn, 1.2 cm cyst, new. L- 12:00, 1cmfn, 1.4 cm cyst, new. 2:00, 3cmfn 1.0 cm complicated cyst, new. 11:00, 1cmfn 1.0 cm complicated cyst, new. Impression: Bilat cystic nodules. Rec US in 1yr. BR2  9/26/23 [facility], R dMMG: Segmental branching pleomorphic calcifications noted. Impression: Suspicious microcalcifications. Rec Right stereo bx. BR4  9/29/23 [facility], R breast UOQ stereo bx path: DCIS. Gr2-3. ER+ %, DE+ %. In Right UOQ, in areas with calcs and without calcs. Concordant.  11/1/23 [facility] Duffy: R- Signal void from tissue marker is seen in the UOQ right breast on image 113 of the axial series. Motion artifact does limit fine detail. Adjacent to the tissue marker is linear nonmass-like enhancement consistent with biopsy-proven DCIS. This is best seen on images 105-108. A small post biopsy hematoma is noted on image 108. No additional suspicious enhancement to suggest multicentric disease. The distribution of pleomorphic calcifications represent a large area of malignancy than what is seen on the MRI. The pleomorphic calcifications measure up to 5.3 cm in AP diameter and up to 5 cm in a cranial to caudad dimension. L- no susp enhancement L breast. Axilla/other: No significant axillary or internal mammary lymphadenopathy. Impression: Biopsy-proven malignancy right breast as above. The pleomorphic calcifications demonstrate a larger area of involvement than the MRI suggests. Measurement of the pleomorphic calcifications given above. Rec surgical or oncological management. BR6  Fhx: No known family history of cancers  CBE: bilateral oncoplastics scars healing well. Pathology reviewed with patient and her daughter, residual DCIS was identified however 2mm from closest (medial) therefore now a negative margin. To see PCP regarding vertigo. Follow up in one week.   PLAN: Follow up in four week F/u with Dr. Fairchild and then med onc. Follow up with Dr. Garcia as scheduled

## 2024-01-16 NOTE — HISTORY OF PRESENT ILLNESS
[FreeTextEntry1] : PCP: Corrie De Dios MD Patient is a 53-year-old premenopausal, Myriad negative, female here today for postop visit s/p 12/28/23 R breast re excision for close inferior margin. 11/10/23 R lumpectomy and L oncoplastics per Dr. Garcia for right upper outer quadrant DCIS, ER/AZ+. Here with her daughter, Mey.  serviced offered, patient declined, requesting daughter translate. 11/10/23 Surgical path: R- DCIS, 30mm, micropapillary, cribriform and solid with intermediate and high grade nuclear atypia and central necrosis. Focally present <1mm from inked inferior, posterior, and anterior tissue edges. Calcs present in DCIS. Uninvolved breast tissue showed apocrine cysts. Unremarkable skin. Bx site change. L- Unremarkable. pTisNx 12/28/23 Surgical path: DCIS Gr2, 3mm. 2mm from closest inked inferior resection margin. Prior bx site change with fat necrosis and fibrosis.  Denies hematoma, denies cellulitis.  9/11/23 [facility], bilat sMMG: Markedly dense. R upper outer quadrant segmental microcalcs noted. No susp mmg finding noted in L breast. Impression: R microcalcs. Rec R dMMG. BR0  9/11/23 [facility], bilat US: R- 6:00, 5cmfn, 1.2 cm cyst, new. L- 12:00, 1cmfn, 1.4 cm cyst, new. 2:00, 3cmfn 1.0 cm complicated cyst, new. 11:00, 1cmfn 1.0 cm complicated cyst, new. Impression: Bilat cystic nodules. Rec US in 1yr. BR2  9/26/23 [facility], R dMMG: Segmental branching pleomorphic calcifications noted. Impression: Suspicious microcalcifications. Rec Right stereo bx. BR4  9/29/23 [facility], R breast UOQ stereo bx path: DCIS. Gr2-3. ER+ %, AZ+ %. In Right UOQ, in areas with calcs and without calcs. Concordant.  11/1/23 [facility] MRI: R- Signal void from tissue marker is seen in the UOQ right breast on image 113 of the axial series. Motion artifact does limit fine detail. Adjacent to the tissue marker is linear nonmass-like enhancement consistent with biopsy-proven DCIS. This is best seen on images 105-108. A small post biopsy hematoma is noted on image 108. No additional suspicious enhancement to suggest multicentric disease. The distribution of pleomorphic calcifications represent a large area of malignancy than what is seen on the MRI. The pleomorphic calcifications measure up to 5.3 cm in AP diameter and up to 5 cm in a cranial to caudad dimension. L- no susp enhancement L breast. Axilla/other: No significant axillary or internal mammary lymphadenopathy. Impression: Biopsy-proven malignancy right breast as above. The pleomorphic calcifications demonstrate a larger area of involvement than the MRI suggests. Measurement of the pleomorphic calcifications given above. Rec surgical or oncological management. BR6  Fhx: No known family history of cancers  CBE: bilateral oncoplastics scars healing well. Pathology reviewed with patient and her daughter. Margins are <1mm at anterior, posterior, inferior and superior. An additional margin was taken superiorly which was negative, posterior margin is at pectoralis. Anterior margin is the lateral margin which was reexcised and negative and is now at the skin. Inferior margin is still <1 mm, would be the only margin needing reeexcision.  Options for re excision vs mastectomy and reconstruction. If reexcision is done, then it would only be the inferior margin since additional margins were done at superior, anterior (lateral) and posterior is at pectoralis. Reviewed options for reconstruction also. Pt to meet with Dr. Garcia and think about her options.  PLAN: To decide about options and pt will get back to us. Will review with Dr. Fairchild about need for PMRT since anterior margin is the lateral margin which is at the skin.

## 2024-01-16 NOTE — HISTORY OF PRESENT ILLNESS
[FreeTextEntry1] : PCP: Corrie De Dios MD Patient is a 53-year-old premenopausal, Myriad negative, female here today for postop visit s/p 12/28/23 R breast re excision for close inferior margin. 11/10/23 R lumpectomy and L oncoplastics per Dr. Garcia for right upper outer quadrant DCIS, ER/RI+. Here with her daughter, Mey.  serviced offered, patient declined, requesting daughter translate. 11/10/23 Surgical path: R- DCIS, 30mm, micropapillary, cribriform and solid with intermediate and high grade nuclear atypia and central necrosis. Focally present <1mm from inked inferior, posterior, and anterior tissue edges. Calcs present in DCIS. Uninvolved breast tissue showed apocrine cysts. Unremarkable skin. Bx site change. L- Unremarkable. pTisNx 12/28/23 Surgical path: DCIS Gr2, 3mm. 2mm from closest inked inferior resection margin. Prior bx site change with fat necrosis and fibrosis.  Denies hematoma, denies cellulitis.  9/11/23 [facility], bilat sMMG: Markedly dense. R upper outer quadrant segmental microcalcs noted. No susp mmg finding noted in L breast. Impression: R microcalcs. Rec R dMMG. BR0  9/11/23 [facility], bilat US: R- 6:00, 5cmfn, 1.2 cm cyst, new. L- 12:00, 1cmfn, 1.4 cm cyst, new. 2:00, 3cmfn 1.0 cm complicated cyst, new. 11:00, 1cmfn 1.0 cm complicated cyst, new. Impression: Bilat cystic nodules. Rec US in 1yr. BR2  9/26/23 [facility], R dMMG: Segmental branching pleomorphic calcifications noted. Impression: Suspicious microcalcifications. Rec Right stereo bx. BR4  9/29/23 [facility], R breast UOQ stereo bx path: DCIS. Gr2-3. ER+ %, RI+ %. In Right UOQ, in areas with calcs and without calcs. Concordant.  11/1/23 [facility] MRI: R- Signal void from tissue marker is seen in the UOQ right breast on image 113 of the axial series. Motion artifact does limit fine detail. Adjacent to the tissue marker is linear nonmass-like enhancement consistent with biopsy-proven DCIS. This is best seen on images 105-108. A small post biopsy hematoma is noted on image 108. No additional suspicious enhancement to suggest multicentric disease. The distribution of pleomorphic calcifications represent a large area of malignancy than what is seen on the MRI. The pleomorphic calcifications measure up to 5.3 cm in AP diameter and up to 5 cm in a cranial to caudad dimension. L- no susp enhancement L breast. Axilla/other: No significant axillary or internal mammary lymphadenopathy. Impression: Biopsy-proven malignancy right breast as above. The pleomorphic calcifications demonstrate a larger area of involvement than the MRI suggests. Measurement of the pleomorphic calcifications given above. Rec surgical or oncological management. BR6  Fhx: No known family history of cancers  CBE: bilateral oncoplastics scars healing well. Pathology reviewed with patient and her daughter. Margins are <1mm at anterior, posterior, inferior and superior. An additional margin was taken superiorly which was negative, posterior margin is at pectoralis. Anterior margin is the lateral margin which was reexcised and negative and is now at the skin. Inferior margin is still <1 mm, would be the only margin needing reeexcision.  Options for re excision vs mastectomy and reconstruction. If reexcision is done, then it would only be the inferior margin since additional margins were done at superior, anterior (lateral) and posterior is at pectoralis. Reviewed options for reconstruction also. Pt to meet with Dr. Garcia and think about her options.  PLAN: To decide about options and pt will get back to us. Will review with Dr. Fairchild about need for PMRT since anterior margin is the lateral margin which is at the skin.

## 2024-01-16 NOTE — PAST MEDICAL HISTORY
[Menarche Age ____] : age at menarche was [unfilled] [Definite ___ (Date)] : the last menstrual period was [unfilled] [Total Preg ___] : G[unfilled] [Live Births ___] : P[unfilled]  [Living ___] : Living: [unfilled] [Age At Live Birth ___] : Age at live birth: [unfilled] [FreeTextEntry8] : yes, 3 years

## 2024-01-16 NOTE — DATA REVIEWED
[FreeTextEntry1] : 12/28/23 Surgical path: DCIS Gr2, 3mm. 2mm from closest inked inferior resection margin. Prior bx site change with fat necrosis and fibrosis.

## 2024-01-16 NOTE — ASSESSMENT
[FreeTextEntry1] : PCP: Corrie De Dios MD Patient is a 53-year-old premenopausal, Myriad negative, female here today for postop visit s/p 12/28/23 R breast re excision for close inferior margin. 11/10/23 R lumpectomy and L oncoplastics per Dr. Garcia for right upper outer quadrant DCIS, ER/CO+. Here with her daughter, Mey.  serviced offered, patient declined, requesting daughter translate. 11/10/23 Surgical path: R- DCIS, 30mm, micropapillary, cribriform and solid with intermediate and high grade nuclear atypia and central necrosis. Focally present <1mm from inked inferior, posterior, and anterior tissue edges. Calcs present in DCIS. Uninvolved breast tissue showed apocrine cysts. Unremarkable skin. Bx site change. L- Unremarkable. pTisNx. However additional margins were removed superiorly and laterally (which was the anterior margin), and they were negative. The posterior margin is at the pectoralis. Only the inferior margin, would need to be reexcised. 12/28/23 Surgical path: DCIS Gr2, 3mm. 2mm from closest inked inferior resection margin. Prior bx site change with fat necrosis and fibrosis.  Feels ok postop, has been dizzy, went to urgent care on Saturday, diagnosis with vertigo and started on meclizine without relief. They plan on contacting PCP. Denies hematoma, denies cellulitis.  9/11/23 [facility], bilat sMMG: Markedly dense. R upper outer quadrant segmental microcalcs noted. No susp mmg finding noted in L breast. Impression: R microcalcs. Rec R dMMG. BR0  9/11/23 [facility], bilat US: R- 6:00, 5cmfn, 1.2 cm cyst, new. L- 12:00, 1cmfn, 1.4 cm cyst, new. 2:00, 3cmfn 1.0 cm complicated cyst, new. 11:00, 1cmfn 1.0 cm complicated cyst, new. Impression: Bilat cystic nodules. Rec US in 1yr. BR2  9/26/23 [facility], R dMMG: Segmental branching pleomorphic calcifications noted. Impression: Suspicious microcalcifications. Rec Right stereo bx. BR4  9/29/23 [facility], R breast UOQ stereo bx path: DCIS. Gr2-3. ER+ %, CO+ %. In Right UOQ, in areas with calcs and without calcs. Concordant.  11/1/23 [facility] Duffy: R- Signal void from tissue marker is seen in the UOQ right breast on image 113 of the axial series. Motion artifact does limit fine detail. Adjacent to the tissue marker is linear nonmass-like enhancement consistent with biopsy-proven DCIS. This is best seen on images 105-108. A small post biopsy hematoma is noted on image 108. No additional suspicious enhancement to suggest multicentric disease. The distribution of pleomorphic calcifications represent a large area of malignancy than what is seen on the MRI. The pleomorphic calcifications measure up to 5.3 cm in AP diameter and up to 5 cm in a cranial to caudad dimension. L- no susp enhancement L breast. Axilla/other: No significant axillary or internal mammary lymphadenopathy. Impression: Biopsy-proven malignancy right breast as above. The pleomorphic calcifications demonstrate a larger area of involvement than the MRI suggests. Measurement of the pleomorphic calcifications given above. Rec surgical or oncological management. BR6  Fhx: No known family history of cancers  CBE: bilateral oncoplastics scars healing well. Pathology reviewed with patient and her daughter, residual DCIS was identified however 2mm from closest (medial) therefore now a negative margin. To see PCP regarding vertigo. Follow up in one week.   PLAN: Follow up in four week F/u with Dr. Fairchild and then med onc. Follow up with Dr. Garcia as scheduled

## 2024-01-17 ENCOUNTER — APPOINTMENT (OUTPATIENT)
Dept: PLASTIC SURGERY | Facility: CLINIC | Age: 54
End: 2024-01-17

## 2024-01-23 ENCOUNTER — APPOINTMENT (OUTPATIENT)
Dept: RADIOLOGY | Facility: CLINIC | Age: 54
End: 2024-01-23
Payer: MEDICAID

## 2024-01-23 PROCEDURE — 77080 DXA BONE DENSITY AXIAL: CPT

## 2024-01-27 ENCOUNTER — OUTPATIENT (OUTPATIENT)
Dept: OUTPATIENT SERVICES | Facility: HOSPITAL | Age: 54
LOS: 1 days | End: 2024-01-27

## 2024-01-27 DIAGNOSIS — D05.11 INTRADUCTAL CARCINOMA IN SITU OF RIGHT BREAST: ICD-10-CM

## 2024-01-29 ENCOUNTER — APPOINTMENT (OUTPATIENT)
Dept: CT IMAGING | Facility: CLINIC | Age: 54
End: 2024-01-29
Payer: MEDICAID

## 2024-01-29 ENCOUNTER — APPOINTMENT (OUTPATIENT)
Dept: HEMATOLOGY ONCOLOGY | Facility: CLINIC | Age: 54
End: 2024-01-29

## 2024-01-29 ENCOUNTER — LABORATORY RESULT (OUTPATIENT)
Age: 54
End: 2024-01-29

## 2024-01-29 LAB
HCT VFR BLD CALC: 36.3 %
HGB BLD-MCNC: 11.6 G/DL
MCHC RBC-ENTMCNC: 26.2 PG
MCHC RBC-ENTMCNC: 32 GM/DL
MCV RBC AUTO: 82.1 FL
PLATELET # BLD AUTO: 255 K/UL
RBC # BLD: 4.42 M/UL
RBC # FLD: 13.5 %
WBC # FLD AUTO: 6.96 K/UL

## 2024-01-29 PROCEDURE — 70450 CT HEAD/BRAIN W/O DYE: CPT

## 2024-02-07 ENCOUNTER — APPOINTMENT (OUTPATIENT)
Dept: HEMATOLOGY ONCOLOGY | Facility: CLINIC | Age: 54
End: 2024-02-07
Payer: MEDICAID

## 2024-02-07 VITALS
OXYGEN SATURATION: 99 % | HEIGHT: 64 IN | DIASTOLIC BLOOD PRESSURE: 73 MMHG | SYSTOLIC BLOOD PRESSURE: 118 MMHG | WEIGHT: 160 LBS | TEMPERATURE: 97.5 F | BODY MASS INDEX: 27.31 KG/M2 | HEART RATE: 64 BPM

## 2024-02-07 PROCEDURE — 99214 OFFICE O/P EST MOD 30 MIN: CPT

## 2024-02-07 NOTE — RESULTS/DATA
[FreeTextEntry1] : #Right breast DCIS- diagnosed on screening mammogram 9/2023  S/p Right breast biopsy 9/29/23 which confirmed ductal carcinoma in situ, grade 2-3, ER > 91%, [place] > 91%.    She underwent right breast lumpectomy w/ left oncoplastics on 11/10/2023 with Dr. Carlyn Walker & Dr. Leigh Garcia. Pathology revealed DCIS, grade 3, spanning 30 mm, DCIS present less than 1 mm from inked anterior posterior superior and inferior margins.  Final lateral margin greater than 2 mm for DCIS and additional superior margin was negative. pT1sNx  S/p left breast re-excision on 12/28/23 - pathology showed residual DCIS (largest measuring 3mm, 2mm from closest margin).  Has healed well from surgery.  Started radiation 2/5/54 w/ Dr. Fairchild (planned to complete on 3/1/24). She understands that anti-estrogen pills will be next step in her treatment plan.  No personal or family hx of VTE.  Contraception - BTL.  We discussed management of high risk breast disorders including ADH, ALH, LCIS and DCIS. We discussed her risk of breast abnormalities in the future including additional high risk lesions or invasive breast cancer which is about twice the risk of the average population.  Options for risk reduction were reviewed including full dose tamoxifen (20mg PO daily x 5 years) vs. low dose tamoxifen (5mg PO daily x 3 years), both of which provide ~50% reduction in future breast events. Would not recommend arimidex given that she is pre-menopausal (confirmed on today's labs).  Given her high risk features- large tumor w/ high grade, preference is for full dose tamoxifen.  - will begin Tamoxifen 20 mg po daily ~ 3/25/24 (3 weeks after completion of radiation)   Follow up in mid-May w/ Dr. Lanier for initial toxicity evaluation.

## 2024-02-07 NOTE — PHYSICAL EXAM
[Fully active, able to carry on all pre-disease performance without restriction] : Status 0 - Fully active, able to carry on all pre-disease performance without restriction [Normal] : affect appropriate [de-identified] : generally well appearing female, NAD, pleasant  [de-identified] : s/p R lumpectomy w/ L oncoplastics, generally healing well, very small areas of opening of her incisions

## 2024-02-07 NOTE — HISTORY OF PRESENT ILLNESS
[de-identified] : Referred by:  Dr. Carlyn Walker (breast surgery) PCP:  Corrie De Diso Diagnosis:  Right breast DCIS Initially presented w/ her Daughter - Bradford Torres (ph [phone])   Kiran Torres is a pre-menopausal Lithuanian speaking female who presented at age 53 in 2023 for evaluation of right breast DCIS. The patient has a medical history of GERD, erosive esophagitis, iron deficiency anemia (required po iron, likely from menorrhagia, 2018 ?), ovarian cysts, environmental allergies. Surgical hx: bilateral tubal ligation.  She presented with her daughter who is a nurse at [employer].  Her history dates back to an abnormal screening mammogram on 23 showing markedly dense breast tissue and microcalcifications in the right upper outer breast.  She denied any breast complaints at that time including breast mass, nipple discharge or breast pain. Diagnostic imaging ensued.  She denied breast masses, skin changes, or nipple discharge.  Right breast biopsy 23 confirmed ductal carcinoma in situ, grade 2-3, ER > 91%, WA > 91%.   Breast MRI performed 23 was suggestive of extensive right breast involvement which was confirmed by two subsequent biopsies.  She was evaluated by Dr. Carlyn Walker from breast surgery and Dr. Leigh Garcia from plastic surgery- she is leaning towards right lumpectomy w/ oncoplastics. Surgery tentatively planned for tomorrow 11/10/23.    She denied recent headaches, visual changes, balance issues, CP, cough, SOB, n/v/d, constipation, unintentional weight loss or new bone or back pain. She had her IUD removed 2 weeks ago, still spotting.   Imaging reviewed: Bilateral screening mammogram - 23 @ [facility] - right breast microcalcifications (Birads 0) Diagnostic right mammogram - 23 @ [facility] - segmental branching pleomorphic calcifications noted, stereotactic core biopsy recommended. MRI breast - 23 - biopsy proven right breast malignancy.  The pleomorphic calcifications demonstrate a larger area of involvement (5 x 5.5 cm) than the MRI suggests.  No lymphadenopathy or left breast findings.    Pathology reviewed: Right breast biopsy - 23 - Ductal carcinoma in situ (solid, cribriform, comedo) w/ associated calcifications, intermediate to high nuclear grade, ER %, WA % Right breast biopsies x 2 - 11/3/23 - extensive DCIS w/ intermediate to high nuclear grade   Genetics:  [employer] multi-gene panel testing negative 10/2023.   HCM: - COVID vaccination:  Pfizer x 2, no boosters - Flu shots - doesn't take - Colonoscopy:  2023 - no findings - Gyn:  2023 ? 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  - Living situation:  Lives in [place] w/ spouse, children, and grandson (4 yo) - Smoking/etoh/illicits:  Never smoker, no etoh, no illicits  - Exercise:  none   OB/GYN Hx: - Age of menarche:  14 - Age of menopause:  n/a - irregular bleeding d/t previous Mirena - Pregnancy history:   LC3  - Age at live birth: 20 - OCP use:  none prior  - Fertility treatments:  none - Hormonal therapy:  none prior  - Breast feeding history:  2 yrs - BC - progesterone IUS dc'd 10/2023 (currently abstinent)    FH: - No known family history of cancer - no known AJ ancestry  [de-identified] : Kiran presents on 2/7/24 for follow up for R breast DCIS.  Accompanied by her daughter (prefers to have daughter function as Indonesian ).  Per Dr. Walker: 3 margins <1 mm, anterior, superior, posterior and inferior on initial lumpectomy. However additional margins were removed superiorly and laterally (which was the anterior margin), and they were negative. The posterior margin is at the pectoralis. Only the inferior margin, needed to be re-excised.  At today's visit she is feeling generally well.  S/p left breast re-excision on 12/28/23 - pathology showed residual DCIS (largest measuring 3mm, 2mm from closest margin).  Has healed well from surgery.  Started radiation 2/5/54 w/ Dr. Fairchild (planned to complete on 3/1/24). She understands that anti-estrogen pills will be next step in her treatment plan.  No personal or family hx of VTE.  Contraception - BTL. She remains pre-menopausal, having monthly menses.   Health Care Maintenance: Updated 2/7/24 Mammogram:  as per breast surgery Colonoscopy:  6/2023 - no findings Pap smear: 8/2023 (?) - no hx abnormal [facility] Dentist:  PCP:  Dermatology screen:  Genetic screen: DEXA:  1/23/24 - normal BMD  Vaccinations:  COVID vaccination:  Flu vaccine:

## 2024-02-08 ENCOUNTER — APPOINTMENT (OUTPATIENT)
Dept: PLASTIC SURGERY | Facility: CLINIC | Age: 54
End: 2024-02-08
Payer: MEDICAID

## 2024-02-08 VITALS
SYSTOLIC BLOOD PRESSURE: 98 MMHG | HEART RATE: 68 BPM | OXYGEN SATURATION: 99 % | BODY MASS INDEX: 27.31 KG/M2 | WEIGHT: 160 LBS | DIASTOLIC BLOOD PRESSURE: 62 MMHG | HEIGHT: 64 IN

## 2024-02-08 PROCEDURE — 99024 POSTOP FOLLOW-UP VISIT: CPT

## 2024-02-09 NOTE — REASON FOR VISIT
[Post Op: _________] : a [unfilled] post op visit [FreeTextEntry1] : states had sx on rt breast. Had procedure done on 12/28/2023.

## 2024-02-09 NOTE — HISTORY OF PRESENT ILLNESS
[FreeTextEntry1] : "I feel ok" Patient presents with her daughter who translates for her.  Patient states that she is feeling well and that she thinks there may be some sutures that are spitting from the incisions, but she thinks everything is healing well.  She states that she has started radiation treatments and does not like it.  She has almost completed a week of treatments and has about 4 remaining.  So far, her skin has not been irritated.  She is using Miaderm cream as instructed.

## 2024-02-09 NOTE — ADDENDUM
[FreeTextEntry1] : All medical record entries were at my direction and personally dictated by me (Dr. Leigh Garcia). I have reviewed the chart and agree that the record accurately reflects my personal performance of the history, physical exam, assessment and plan.

## 2024-02-09 NOTE — ASSESSMENT
[FreeTextEntry1] : 55 y/o female for follow up  Patient is healing well Patient is to continue to receive RT treatments  Follow up at the end of her treatments with  All questions and concerns addressed

## 2024-02-09 NOTE — PHYSICAL EXAM
[NI] : Normal [de-identified] : Right:Incisions are C/D/I, there is no longer any oozing noted from T-junction, a few sutures are trimmed. A small suture granuloma is found and some pus is expressed. No erythema, or edema is noted.  Left: Healed well Miaderm cream applied.

## 2024-02-12 ENCOUNTER — APPOINTMENT (OUTPATIENT)
Dept: BREAST CENTER | Facility: CLINIC | Age: 54
End: 2024-02-12
Payer: MEDICAID

## 2024-02-12 ENCOUNTER — LABORATORY RESULT (OUTPATIENT)
Age: 54
End: 2024-02-12

## 2024-02-12 ENCOUNTER — APPOINTMENT (OUTPATIENT)
Dept: HEMATOLOGY ONCOLOGY | Facility: CLINIC | Age: 54
End: 2024-02-12

## 2024-02-12 VITALS
DIASTOLIC BLOOD PRESSURE: 72 MMHG | HEIGHT: 64 IN | BODY MASS INDEX: 27.49 KG/M2 | SYSTOLIC BLOOD PRESSURE: 106 MMHG | WEIGHT: 161 LBS | HEART RATE: 63 BPM | OXYGEN SATURATION: 99 %

## 2024-02-12 LAB
HCT VFR BLD CALC: 37.5 %
HGB BLD-MCNC: 11.9 G/DL
MCHC RBC-ENTMCNC: 26 PG
MCHC RBC-ENTMCNC: 31.7 GM/DL
MCV RBC AUTO: 81.9 FL
PLATELET # BLD AUTO: 245 K/UL
RBC # BLD: 4.58 M/UL
RBC # FLD: 14 %
WBC # FLD AUTO: 6.84 K/UL

## 2024-02-12 PROCEDURE — 85027 COMPLETE CBC AUTOMATED: CPT

## 2024-02-12 PROCEDURE — 99024 POSTOP FOLLOW-UP VISIT: CPT

## 2024-02-12 NOTE — ASSESSMENT
[FreeTextEntry1] : PCP: Corrie De Dios MD  Patient is a 54-year-old premenopausal, Myriad negative, female here today for postop visit s/p 12/28/23 R breast re excision for close inferior margin. 11/10/23 R lumpectomy and L oncoplastics per Dr. Garcia for right upper outer quadrant DCIS, ER/ME+. Here with her daughter, Mey.  serviced offered, patient declined, requesting daughter translate. 11/10/23 Surgical path: R- DCIS, 30mm, micropapillary, cribriform and solid with intermediate and high grade nuclear atypia and central necrosis. Focally present <1mm from inked inferior, posterior, and anterior tissue edges. Calcs present in DCIS. Uninvolved breast tissue showed apocrine cysts. Unremarkable skin. Bx site change. L- Unremarkable. pTisNx. However additional margins were removed superiorly and laterally (which was the anterior margin), and they were negative. The posterior margin is at the pectoralis. Only the inferior margin, would need to be reexcised. 12/28/23 Surgical path: DCIS Gr2, 3mm. 2mm from closest inked inferior resection margin. Prior bx site change with fat necrosis and fibrosis.  Feeling well, vertigo has since resolved. She is currently undergoing RT with Dr. Fairchild.  9/11/23 [facility], bilat sMMG: Markedly dense. R upper outer quadrant segmental microcalcs noted. No susp mmg finding noted in L breast. Impression: R microcalcs. Rec R dMMG. BR0  9/11/23 [facility], bilat US: R- 6:00, 5cmfn, 1.2 cm cyst, new. L- 12:00, 1cmfn, 1.4 cm cyst, new. 2:00, 3cmfn 1.0 cm complicated cyst, new. 11:00, 1cmfn 1.0 cm complicated cyst, new. Impression: Bilat cystic nodules. Rec US in 1yr. BR2  9/26/23 [facility], R dMMG: Segmental branching pleomorphic calcifications noted. Impression: Suspicious microcalcifications. Rec Right stereo bx. BR4  9/29/23 [facility], R breast UOQ stereo bx path: DCIS. Gr2-3. ER+ %, ME+ %. In Right UOQ, in areas with calcs and without calcs. Concordant.  11/1/23 ZAMZAM Duffy: R- Signal void from tissue marker is seen in the UOQ right breast on image 113 of the axial series. Motion artifact does limit fine detail. Adjacent to the tissue marker is linear nonmass-like enhancement consistent with biopsy-proven DCIS. This is best seen on images 105-108. A small post biopsy hematoma is noted on image 108. No additional suspicious enhancement to suggest multicentric disease. The distribution of pleomorphic calcifications represent a large area of malignancy than what is seen on the MRI. The pleomorphic calcifications measure up to 5.3 cm in AP diameter and up to 5 cm in a cranial to caudad dimension. L- no susp enhancement L breast. Axilla/other: No significant axillary or internal mammary lymphadenopathy. Impression: Biopsy-proven malignancy right breast as above. The pleomorphic calcifications demonstrate a larger area of involvement than the MRI suggests. Measurement of the pleomorphic calcifications given above. Rec surgical or oncological management. BR6  Fhx: No known family history of cancers  CBE: bilateral oncoplastics scars healing well. R breast with mild ERBT changes. Continue RT per Dr. Fairchild. Plan to start tamoxifen following radiation per Dr. Lanier. Bilat mmg due 6/24, follow up after or sooner if concerns. Pt is planning on going to [place] in June so will f.u in July. Reviewed with patient her exercise regimen. Discussed exercise oncology and the importance of regular exercise, 300min per week, including cardiac and resistance training to overall health and also reduction of 20-30% for recurrence. Discussed virtual exercise programs, [employer].  Contact given for Dr. Ezio Kuo for activity and further exercise oncology review.  PLAN: mmg 6/24 then f/u Follow up with Yannick Interiano and Radha scheduled

## 2024-02-12 NOTE — HISTORY OF PRESENT ILLNESS
[FreeTextEntry1] : PCP: Corrie De Dios MD  Patient is a 54-year-old premenopausal, Myriad negative, female here today for postop visit s/p 12/28/23 R breast re excision for close inferior margin. 11/10/23 R lumpectomy and L oncoplastics per Dr. Garcia for right upper outer quadrant DCIS, ER/SD+. Here with her daughter, Mey.  serviced offered, patient declined, requesting daughter translate. 11/10/23 Surgical path: R- DCIS, 30mm, micropapillary, cribriform and solid with intermediate and high grade nuclear atypia and central necrosis. Focally present <1mm from inked inferior, posterior, and anterior tissue edges. Calcs present in DCIS. Uninvolved breast tissue showed apocrine cysts. Unremarkable skin. Bx site change. L- Unremarkable. pTisNx. However additional margins were removed superiorly and laterally (which was the anterior margin), and they were negative. The posterior margin is at the pectoralis. Only the inferior margin, would need to be reexcised. 12/28/23 Surgical path: DCIS Gr2, 3mm. 2mm from closest inked inferior resection margin. Prior bx site change with fat necrosis and fibrosis.  Feeling well, vertigo has since resolved. She is currently undergoing RT with Dr. Fairchild.  9/11/23 [facility], bilat sMMG: Markedly dense. R upper outer quadrant segmental microcalcs noted. No susp mmg finding noted in L breast. Impression: R microcalcs. Rec R dMMG. BR0  9/11/23 [facility], bilat US: R- 6:00, 5cmfn, 1.2 cm cyst, new. L- 12:00, 1cmfn, 1.4 cm cyst, new. 2:00, 3cmfn 1.0 cm complicated cyst, new. 11:00, 1cmfn 1.0 cm complicated cyst, new. Impression: Bilat cystic nodules. Rec US in 1yr. BR2  9/26/23 [facility], R dMMG: Segmental branching pleomorphic calcifications noted. Impression: Suspicious microcalcifications. Rec Right stereo bx. BR4  9/29/23 [facility], R breast UOQ stereo bx path: DCIS. Gr2-3. ER+ %, SD+ %. In Right UOQ, in areas with calcs and without calcs. Concordant.  11/1/23 ZAMZAM Duffy: R- Signal void from tissue marker is seen in the UOQ right breast on image 113 of the axial series. Motion artifact does limit fine detail. Adjacent to the tissue marker is linear nonmass-like enhancement consistent with biopsy-proven DCIS. This is best seen on images 105-108. A small post biopsy hematoma is noted on image 108. No additional suspicious enhancement to suggest multicentric disease. The distribution of pleomorphic calcifications represent a large area of malignancy than what is seen on the MRI. The pleomorphic calcifications measure up to 5.3 cm in AP diameter and up to 5 cm in a cranial to caudad dimension. L- no susp enhancement L breast. Axilla/other: No significant axillary or internal mammary lymphadenopathy. Impression: Biopsy-proven malignancy right breast as above. The pleomorphic calcifications demonstrate a larger area of involvement than the MRI suggests. Measurement of the pleomorphic calcifications given above. Rec surgical or oncological management. BR6  Fhx: No known family history of cancers

## 2024-02-29 ENCOUNTER — APPOINTMENT (OUTPATIENT)
Dept: PLASTIC SURGERY | Facility: CLINIC | Age: 54
End: 2024-02-29
Payer: MEDICAID

## 2024-02-29 VITALS
SYSTOLIC BLOOD PRESSURE: 98 MMHG | BODY MASS INDEX: 27.31 KG/M2 | TEMPERATURE: 97.6 F | DIASTOLIC BLOOD PRESSURE: 68 MMHG | WEIGHT: 160 LBS | OXYGEN SATURATION: 98 % | HEART RATE: 72 BPM | HEIGHT: 64 IN

## 2024-02-29 DIAGNOSIS — Z09 ENCOUNTER FOR FOLLOW-UP EXAMINATION AFTER COMPLETED TREATMENT FOR CONDITIONS OTHER THAN MALIGNANT NEOPLASM: ICD-10-CM

## 2024-02-29 PROCEDURE — 99024 POSTOP FOLLOW-UP VISIT: CPT

## 2024-02-29 NOTE — PHYSICAL EXAM
[NI] : Normal [de-identified] : NL respiratory effort noted  [de-identified] : bilateral breasts well healed Few spiting sutures. Significant erythema on the right, but no skin breakdown.

## 2024-02-29 NOTE — HISTORY OF PRESENT ILLNESS
[FreeTextEntry1] : Pt reports that her skin is very sore, especially under her right arm, from the radiation. Last treatment is scheduled for tomorrow.

## 2024-02-29 NOTE — REASON FOR VISIT
[Family Member] : family member [Follow-Up: _____] : a [unfilled] follow-up visit [FreeTextEntry1] : patient states she is doing well and there are no issues. She is still doing radiation, her last day is 03/01/2024.

## 2024-02-29 NOTE — ASSESSMENT
[FreeTextEntry1] : Doing very well. OK to start scar care on the leftonce approved by Dr. Fairchild (probably a few more weeks). Pt was given written scar care instructions that were reviewed verbally, as follows:  Scars tend to heal fairly quickly (in 4-14 days depending on the site), then mature slowly over time. The vast majority of improvement occurs in the first 2-3 months, with the pink/red scar becoming softer, more pliable and lighter in color (depending upon skin tone). At 6 months, the scar appears lighter and at approximately one year, the scar reaches maximum improvement (with a range from 9-18 months). Scar care should include skin moisturizer, sunscreen, and if desired, silicone gel.  Silicone gel use should begin after all scabs and crust are gone and the scars are smooth and dry, usually about 3 weeks. The silicone gel should be used twice a day for a treatment course of three months to achieve benefit. Sunscreen and/or makeup can be applied after the silicone gel has been applied and allowed to dry. If necessary, scar revision can be performed after 6 months (even better to wait 9-12 months). This can significantly improve some scars, but requires going through the healing and maturing process again from the beginning.

## 2024-05-08 ENCOUNTER — OUTPATIENT (OUTPATIENT)
Dept: OUTPATIENT SERVICES | Facility: HOSPITAL | Age: 54
LOS: 1 days | End: 2024-05-08

## 2024-05-08 DIAGNOSIS — D05.11 INTRADUCTAL CARCINOMA IN SITU OF RIGHT BREAST: ICD-10-CM

## 2024-05-09 ENCOUNTER — APPOINTMENT (OUTPATIENT)
Dept: HEMATOLOGY ONCOLOGY | Facility: CLINIC | Age: 54
End: 2024-05-09
Payer: MEDICAID

## 2024-05-09 VITALS
OXYGEN SATURATION: 97 % | TEMPERATURE: 98.1 F | SYSTOLIC BLOOD PRESSURE: 106 MMHG | DIASTOLIC BLOOD PRESSURE: 68 MMHG | HEART RATE: 62 BPM | BODY MASS INDEX: 26.53 KG/M2 | WEIGHT: 155.4 LBS | HEIGHT: 64 IN

## 2024-05-09 PROCEDURE — G2211 COMPLEX E/M VISIT ADD ON: CPT | Mod: NC,1L

## 2024-05-09 PROCEDURE — 99214 OFFICE O/P EST MOD 30 MIN: CPT

## 2024-05-09 RX ORDER — TAMOXIFEN CITRATE 20 MG/1
20 TABLET, FILM COATED ORAL DAILY
Qty: 90 | Refills: 3 | Status: ACTIVE | COMMUNITY
Start: 2024-02-07 | End: 1900-01-01

## 2024-05-09 NOTE — PHYSICAL EXAM
[Fully active, able to carry on all pre-disease performance without restriction] : Status 0 - Fully active, able to carry on all pre-disease performance without restriction [Normal] : affect appropriate [de-identified] : generally well appearing female, NAD, pleasant  [de-identified] : s/p R lumpectomy (and radiation) and L oncoplastics, healing well.

## 2024-05-09 NOTE — HISTORY OF PRESENT ILLNESS
[de-identified] : Referred by:  Dr. Carlyn Walker (breast surgery) PCP:  Corrie De Dios Diagnosis:  Right breast DCIS Initially presented w/ her Daughter - Bradford Torres (ph [phone])   Kiran Torres is a pre-menopausal Belarusian speaking female who presented at age 53 in 2023 for evaluation of right breast DCIS. The patient has a medical history of GERD, erosive esophagitis, iron deficiency anemia (required po iron, likely from menorrhagia, 2018 ?), ovarian cysts, environmental allergies. Surgical hx: bilateral tubal ligation.  She presented with her daughter who is a nurse at [employer].  Her history dates back to an abnormal screening mammogram on 23 showing markedly dense breast tissue and microcalcifications in the right upper outer breast.  She denied any breast complaints at that time including breast mass, nipple discharge or breast pain. Diagnostic imaging ensued.  She denied breast masses, skin changes, or nipple discharge.  Right breast biopsy 23 confirmed ductal carcinoma in situ, grade 2-3, ER > 91%, IA > 91%.   Breast MRI performed 23 was suggestive of extensive right breast involvement which was confirmed by two subsequent biopsies.  She was evaluated by Dr. Carlyn Walker from breast surgery and Dr. Leigh Garcia from plastic surgery- she is leaning towards right lumpectomy w/ oncoplastics. Surgery tentatively planned for tomorrow 11/10/23.    She denied recent headaches, visual changes, balance issues, CP, cough, SOB, n/v/d, constipation, unintentional weight loss or new bone or back pain. She had her IUD removed in 10/2023, still spotting.   Imaging reviewed: Bilateral screening mammogram - 23 @ [facility] - right breast microcalcifications (Birads 0) Diagnostic right mammogram - 23 @ [facility] - segmental branching pleomorphic calcifications noted, stereotactic core biopsy recommended. MRI breast - 23 - biopsy proven right breast malignancy.  The pleomorphic calcifications demonstrate a larger area of involvement (5 x 5.5 cm) than the MRI suggests.  No lymphadenopathy or left breast findings.    Pathology reviewed: Right breast biopsy - 23 - Ductal carcinoma in situ (solid, cribriform, comedo) w/ associated calcifications, intermediate to high nuclear grade, ER %, IA % Right breast biopsies x 2 - 11/3/23 - extensive DCIS w/ intermediate to high nuclear grade   Genetics:  [employer] multi-gene panel testing negative 10/2023.   HCM: - COVID vaccination:  Pfizer x 2, no boosters - Flu shots - doesn't take - Colonoscopy:  2023 - no findings - Gyn:  2023 ? 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  - Living situation:  Lives in [place] w/ spouse, children, and grandson (4 yo) - Smoking/etoh/illicits:  Never smoker, no etoh, no illicits  - Exercise:  none   OB/GYN Hx: - Age of menarche:  14 - Age of menopause:  n/a - irregular bleeding d/t previous Mirena - Pregnancy history:   LC3  - Age at live birth: 20 - OCP use:  none prior  - Fertility treatments:  none - Hormonal therapy:  none prior  - Breast feeding history:  2 yrs - BC - progesterone IUS dc'd 10/2023 (currently abstinent)    FH: - No known family history of cancer - no known AJ ancestry  [de-identified] : Kiran presents on 5/9/24 for follow up for R breast DCIS.  Accompanied by her daughter and 6 yo grandson (prefers to have daughter function as Chinese ).  Per Dr. Walker: 3 margins <1 mm, anterior, superior, posterior and inferior on initial lumpectomy. However additional margins were removed superiorly and laterally (which was the anterior margin), and they were negative. The posterior margin is at the pectoralis. Only the inferior margin, needed to be re-excised. Completed radiation w/ Dr. Fairchild in 3/2024 Started Tamoxifen 20 mg daily on 3/25/24  At today's visit she is feeling generally well.  S/p left breast re-excision on 12/28/23 - pathology showed residual DCIS (largest measuring 3mm, 2mm from closest margin).  Has healed well from surgery.  Completed radiation w/ Dr. Fairchild in 3/2024 - has good ROM.  Started Tamoxifen 20 mg daily dose - no hot flushes or notable toxicities.  Had a very light menstrual cycle last month.  No personal or family hx of VTE.  Contraception - BTL.  She will be traveling to [place] for two months followed by mammogram in 7/2024.  Appetite and energy level are good (denies labs for anemia today).  Denies recent headaches, dizziness, visual changes, balance issues, CP, cough, SOB, n/v/d, constipation, or new bone pain.  Health Care Maintenance: Updated 5/9/24 Mammogram:  as per breast surgery - planned for 7/2024 Colonoscopy:  6/2023 - no findings Pap smear: 8/2023 (?) - no hx abnormal [facility] Dentist:  PCP:  Dermatology screen:  Genetic screen: DEXA:  1/23/24 - normal BMD  Vaccinations:  COVID vaccination:  Flu vaccine:

## 2024-05-09 NOTE — RESULTS/DATA
[FreeTextEntry1] : #Right breast DCIS- diagnosed on screening mammogram 9/2023  S/p Right breast biopsy 9/29/23 which confirmed ductal carcinoma in situ, grade 2-3, ER > 91%, LA > 91%.    She underwent right breast lumpectomy w/ left oncoplastics on 11/10/2023 with Dr. Carlyn Walker & Dr. Leigh Garcia. Pathology revealed DCIS, grade 3, spanning 30 mm, DCIS present less than 1 mm from inked anterior posterior superior and inferior margins.  Final lateral margin greater than 2 mm for DCIS and additional superior margin was negative. pT1sNx  S/p left breast re-excision on 12/28/23 - pathology showed residual DCIS (largest measuring 3mm, 2mm from closest margin).   Has healed well from surgery. No personal or family hx of VTE.  Contraception - BTL.  We discussed management of high risk breast disorders including ADH, ALH, LCIS and DCIS. We discussed her risk of breast abnormalities in the future including additional high risk lesions or invasive breast cancer which is about twice the risk of the average population.  Options for risk reduction were reviewed including full dose tamoxifen (20mg PO daily x 5 years) vs. low dose tamoxifen (5mg PO daily x 3 years), both of which provide ~50% reduction in future breast events. Would not recommend arimidex given that she is pre-menopausal   Given her high risk features- large tumor w/ high grade, preference is for full dose tamoxifen. Completed radiation 3/2024 w/ Dr. Fairchild  Started Tamoxifen 20 mg po daily on3/25/24 - no toxicities at present Reviewed prevention of VTE given upcoming travel Advised to hold Tamoxifen x3 days prior to each flight (can resume the day after travel) Planned for breast imaging in 7/2024 Follow up w/ Dr. Lanier in 9/2024, sooner prn

## 2024-05-16 ENCOUNTER — APPOINTMENT (OUTPATIENT)
Dept: OTOLARYNGOLOGY | Facility: CLINIC | Age: 54
End: 2024-05-16
Payer: MEDICAID

## 2024-05-16 ENCOUNTER — APPOINTMENT (OUTPATIENT)
Dept: PLASTIC SURGERY | Facility: CLINIC | Age: 54
End: 2024-05-16
Payer: MEDICAID

## 2024-05-16 VITALS — WEIGHT: 164 LBS | BODY MASS INDEX: 27.32 KG/M2 | HEIGHT: 65 IN

## 2024-05-16 VITALS
HEIGHT: 64 IN | DIASTOLIC BLOOD PRESSURE: 72 MMHG | HEART RATE: 70 BPM | SYSTOLIC BLOOD PRESSURE: 108 MMHG | TEMPERATURE: 98.3 F | WEIGHT: 154 LBS | OXYGEN SATURATION: 98 % | BODY MASS INDEX: 26.29 KG/M2

## 2024-05-16 DIAGNOSIS — L90.5 SCAR CONDITIONS AND FIBROSIS OF SKIN: ICD-10-CM

## 2024-05-16 DIAGNOSIS — J34.2 DEVIATED NASAL SEPTUM: ICD-10-CM

## 2024-05-16 DIAGNOSIS — Z91.09 OTHER ALLERGY STATUS, OTHER THAN TO DRUGS AND BIOLOGICAL SUBSTANCES: ICD-10-CM

## 2024-05-16 DIAGNOSIS — J32.2 CHRONIC ETHMOIDAL SINUSITIS: ICD-10-CM

## 2024-05-16 DIAGNOSIS — H68.022 CHRONIC EUSTACHIAN SALPINGITIS, LEFT EAR: ICD-10-CM

## 2024-05-16 DIAGNOSIS — J34.3 HYPERTROPHY OF NASAL TURBINATES: ICD-10-CM

## 2024-05-16 DIAGNOSIS — H93.12 TINNITUS, LEFT EAR: ICD-10-CM

## 2024-05-16 DIAGNOSIS — Z86.000 PERSONAL HISTORY OF IN-SITU NEOPLASM OF BREAST: ICD-10-CM

## 2024-05-16 DIAGNOSIS — H90.3 SENSORINEURAL HEARING LOSS, BILATERAL: ICD-10-CM

## 2024-05-16 PROCEDURE — 92553 AUDIOMETRY AIR & BONE: CPT

## 2024-05-16 PROCEDURE — 99204 OFFICE O/P NEW MOD 45 MIN: CPT | Mod: 25

## 2024-05-16 PROCEDURE — 99213 OFFICE O/P EST LOW 20 MIN: CPT

## 2024-05-16 PROCEDURE — 92570 ACOUSTIC IMMITANCE TESTING: CPT

## 2024-05-16 PROCEDURE — 31231 NASAL ENDOSCOPY DX: CPT

## 2024-05-16 RX ORDER — AZELASTINE HYDROCHLORIDE 137 UG/1
0.1 SPRAY, METERED NASAL TWICE DAILY
Qty: 1 | Refills: 5 | Status: ACTIVE | COMMUNITY
Start: 2024-05-16 | End: 1900-01-01

## 2024-05-16 RX ORDER — DOXYCYCLINE 100 MG/1
100 CAPSULE ORAL TWICE DAILY
Qty: 20 | Refills: 1 | Status: ACTIVE | COMMUNITY
Start: 2024-05-16 | End: 1900-01-01

## 2024-05-16 NOTE — ASSESSMENT
[FreeTextEntry1] : deviated septum mild hypertrophic turbinates allergic rhinitis trial azelastine spray doxy audio and tymp wnl as tinnitus consider ct sinus consider office rf turbinates

## 2024-05-16 NOTE — HISTORY OF PRESENT ILLNESS
[de-identified] : co left ear noise in left ear nasal congestion co persistent colored nasal dc to primary and dx fluid n ears flonase and Zithromax, ipratropium initial sore throat better now rx vertigo and better resolved seasonal allergies  loratadine, asthma-albuterol prn allergy desens in past

## 2024-05-16 NOTE — PROCEDURE
[FreeTextEntry6] : Indication for procedure:  Unable to adequately examine mid and posterior nasal cavity with anterior rhinoscopy The patient has nasal congestion  colored nasal dc Sinus endoscope # 99 Nasal septum exam shows septal deviation to the left 1-2 plus at valve The inferior nasal turbinates are moderately hypertrophic in size bilaterally. The sinus endoscope was introduced into the right nares exam right middle meatus reveals no mucopus or inflammation.  The right middle turbinate is WNL. The scope was advanced and the sphenoethmoid region was inspected. The superior meatus, superior turbinate and nasal vault are unremarkable.  The nasopharynx is unremarkable without inflammation or mass. The sinus endoscope was introduced into the left nares and exam left middle meatus reveals no mucopus or inflammation.  The left middle turbinate is WNL. The scope was advanced and the sphenoethmoid region was inspected. The left superior meatus and nasal vault are unremarkable. mucopus np

## 2024-05-16 NOTE — REVIEW OF SYSTEMS
[Hearing Loss] : hearing loss [Ear Drainage] : ear drainage [Patient Intake Form Reviewed] : Patient intake form was reviewed [Negative] : Ear [de-identified] : pressure

## 2024-05-16 NOTE — DATA REVIEWED
[de-identified] :  Type A tymps, AU. ARTs present, AU. ART Decay Neg, AU. Testing via inserts:  - 8000Hz, AU. CNT speech; language barrier.

## 2024-05-17 RX ORDER — DOXYCYCLINE HYCLATE 100 MG/1
100 CAPSULE ORAL
Qty: 20 | Refills: 1 | Status: ACTIVE | COMMUNITY
Start: 2024-05-17 | End: 1900-01-01

## 2024-05-18 NOTE — REASON FOR VISIT
[Follow-Up: _____] : a [unfilled] follow-up visit [Family Member] : family member [FreeTextEntry1] : patient states that she does still have some pain in her rt arm and breast when she attempts to do anything. She states overall doing good.

## 2024-05-18 NOTE — HISTORY OF PRESENT ILLNESS
[FreeTextEntry1] : "I have some discomfort in right breast"  Patient presents with her daughter and states that she is feeling some discomfort in her right breast and some tightness under that arm.  She states that especially after she has done housework, she feels a lot of discomfort.  Otherwise, she states that she is doing well.  She is planning on leaving next week for [place] for two months. She has no other complaints today.

## 2024-05-18 NOTE — ASSESSMENT
[FreeTextEntry1] : 53 y/o female for follow up   Healed well Advised patient to arrange a consultation with Dr Kuo when she returns from [place] in order to work on the tight area under her right arm. All questions and concerns addressed. Plan and patient status as per Dr Garcia. Follow up in three months.

## 2024-05-18 NOTE — PHYSICAL EXAM
[NI] : Normal [de-identified] : Right: Incisions are healed well, some radiation changes in the skin are noted.  The skin of right breast is a bit firmer and there is some tightness noted at axillary incision. Left: Healed well

## 2024-07-23 DIAGNOSIS — R92.30 DENSE BREASTS, UNSPECIFIED: ICD-10-CM

## 2024-07-31 NOTE — HISTORY OF PRESENT ILLNESS
[FreeTextEntry1] : PCP: Corrie De Dios MD  Patient is a 54-year-old premenopausal, Myriad negative, female here today for bilateral breast pain? Pt is s/p 12/28/23 R breast re excision for close inferior margin. 11/10/23 R lumpectomy and L oncoplastics per Dr. Garcia for right upper outer quadrant DCIS, ER/[place]+. Here with her daughter, Mey.  serviced offered, patient declined, requesting daughter translate. 11/10/23 Surgical path: R- DCIS, 30mm, micropapillary, cribriform and solid with intermediate and high grade nuclear atypia and central necrosis. Focally present <1mm from inked inferior, posterior, and anterior tissue edges. Calcs present in DCIS. Uninvolved breast tissue showed apocrine cysts. Unremarkable skin. Bx site change. L- Unremarkable. pTisNx. However additional margins were removed superiorly and laterally (which was the anterior margin), and they were negative. The posterior margin is at the pectoralis. Only the inferior margin, would need to be reexcised. 12/28/23 Surgical path: DCIS Gr2, 3mm. 2mm from closest inked inferior resection margin. Prior bx site change with fat necrosis and fibrosis.  Feeling well, vertigo has since resolved. She is currently undergoing RT with Dr. Fairchild.  9/11/23 ZP, bilat sMMG: Markedly dense. R upper outer quadrant segmental microcalcs noted. No susp mmg finding noted in L breast. Impression: R microcalcs. Rec R dMMG. BR0  9/11/23 ZP, bilat US: R- 6:00, 5cmfn, 1.2 cm cyst, new. L- 12:00, 1cmfn, 1.4 cm cyst, new. 2:00, 3cmfn 1.0 cm complicated cyst, new. 11:00, 1cmfn 1.0 cm complicated cyst, new. Impression: Bilat cystic nodules. Rec US in 1yr. BR2  9/26/23 ZP, R dMMG: Segmental branching pleomorphic calcifications noted. Impression: Suspicious microcalcifications. Rec Right stereo bx. BR4  9/29/23 ZP, R breast UOQ stereo bx path: DCIS. Gr2-3. ER+ %, [place]+ %. In Right UOQ, in areas with calcs and without calcs. Concordant.  11/1/23 ZAMZAM Duffy: R- Signal void from tissue marker is seen in the UOQ right breast on image 113 of the axial series. Motion artifact does limit fine detail. Adjacent to the tissue marker is linear nonmass-like enhancement consistent with biopsy-proven DCIS. This is best seen on images 105-108. A small post biopsy hematoma is noted on image 108. No additional suspicious enhancement to suggest multicentric disease. The distribution of pleomorphic calcifications represent a large area of malignancy than what is seen on the MRI. The pleomorphic calcifications measure up to 5.3 cm in AP diameter and up to 5 cm in a cranial to caudad dimension. L- no susp enhancement L breast. Axilla/other: No significant axillary or internal mammary lymphadenopathy. Impression: Biopsy-proven malignancy right breast as above. The pleomorphic calcifications demonstrate a larger area of involvement than the MRI suggests. Measurement of the pleomorphic calcifications given above. Rec surgical or oncological management. BR6  Fhx: No known family history of cancers  CBE: bilateral oncoplastics scars healing well. R breast with mild ERBT changes. Continue RT per Dr. Fairchild. Plan to start tamoxifen following radiation per Dr. Lanier. Bilearnest mmg due 6/24, follow up after or sooner if concerns. Pt is planning on going to [place] in June so will f.u in July. Reviewed with patient her exercise regimen. Discussed exercise oncology and the importance of regular exercise, 300min per week, including cardiac and resistance training to overall health and also reduction of 20-30% for recurrence. Discussed virtual exercise programs, [email or web address]. Contact given for Dr. Ezio Kuo for activity and further exercise oncology review.  PLAN: mmg 6/24 then f/u Follow up with Yannick Interiano and Radha scheduled.

## 2024-08-07 ENCOUNTER — APPOINTMENT (OUTPATIENT)
Dept: MAMMOGRAPHY | Facility: CLINIC | Age: 54
End: 2024-08-07

## 2024-08-07 ENCOUNTER — APPOINTMENT (OUTPATIENT)
Dept: ULTRASOUND IMAGING | Facility: CLINIC | Age: 54
End: 2024-08-07

## 2024-08-07 ENCOUNTER — OUTPATIENT (OUTPATIENT)
Dept: OUTPATIENT SERVICES | Facility: HOSPITAL | Age: 54
LOS: 1 days | End: 2024-08-07
Payer: MEDICAID

## 2024-08-07 ENCOUNTER — RESULT REVIEW (OUTPATIENT)
Age: 54
End: 2024-08-07

## 2024-08-07 DIAGNOSIS — Z86.000 PERSONAL HISTORY OF IN-SITU NEOPLASM OF BREAST: ICD-10-CM

## 2024-08-07 PROCEDURE — G0279: CPT

## 2024-08-07 PROCEDURE — 77066 DX MAMMO INCL CAD BI: CPT

## 2024-08-07 PROCEDURE — 77066 DX MAMMO INCL CAD BI: CPT | Mod: 26

## 2024-08-07 PROCEDURE — 76641 ULTRASOUND BREAST COMPLETE: CPT

## 2024-08-07 PROCEDURE — G0279: CPT | Mod: 26

## 2024-08-07 PROCEDURE — 76641 ULTRASOUND BREAST COMPLETE: CPT | Mod: 26,50

## 2024-08-12 ENCOUNTER — APPOINTMENT (OUTPATIENT)
Dept: BREAST CENTER | Facility: CLINIC | Age: 54
End: 2024-08-12
Payer: MEDICAID

## 2024-08-12 VITALS
BODY MASS INDEX: 25.49 KG/M2 | SYSTOLIC BLOOD PRESSURE: 115 MMHG | HEIGHT: 65 IN | HEART RATE: 64 BPM | OXYGEN SATURATION: 97 % | TEMPERATURE: 98.4 F | WEIGHT: 153 LBS | DIASTOLIC BLOOD PRESSURE: 76 MMHG

## 2024-08-12 DIAGNOSIS — Z78.9 OTHER SPECIFIED HEALTH STATUS: ICD-10-CM

## 2024-08-12 DIAGNOSIS — N60.02 SOLITARY CYST OF RIGHT BREAST: ICD-10-CM

## 2024-08-12 DIAGNOSIS — N60.01 SOLITARY CYST OF RIGHT BREAST: ICD-10-CM

## 2024-08-12 DIAGNOSIS — D05.11 INTRADUCTAL CARCINOMA IN SITU OF RIGHT BREAST: ICD-10-CM

## 2024-08-12 PROCEDURE — 99214 OFFICE O/P EST MOD 30 MIN: CPT

## 2024-08-12 NOTE — PHYSICAL EXAM
[Normocephalic] : normocephalic [Atraumatic] : atraumatic [Supple] : supple [No Supraclavicular Adenopathy] : no supraclavicular adenopathy [No Thyromegaly] : no thyromegaly [Examined in the supine and seated position] : examined in the supine and seated position [Symmetrical] : symmetrical [Bra Size: ___] : Bra Size: [unfilled] [No dominant masses] : no dominant masses in right breast  [No dominant masses] : no dominant masses left breast [No Nipple Retraction] : no left nipple retraction [No Nipple Discharge] : no left nipple discharge [No Axillary Lymphadenopathy] : no left axillary lymphadenopathy [No Edema] : no edema [No Swelling] : no swelling [Full ROM] : full range of motion [No Rashes] : no rashes [No Ulceration] : no ulceration [de-identified] : bilateral oncoplastics scars healed well. R breast with mild ERBT changes. ENRICO

## 2024-08-12 NOTE — CONSULT LETTER
[Dear  ___] : Dear  [unfilled], [Courtesy Letter:] : I had the pleasure of seeing your patient, [unfilled], in my office today. [Please see my note below.] : Please see my note below. [Consult Closing:] : Thank you very much for allowing me to participate in the care of this patient.  If you have any questions, please do not hesitate to contact me. [Sincerely,] : Sincerely, [FreeTextEntry3] : Carlyn Walker MD

## 2024-08-12 NOTE — HISTORY OF PRESENT ILLNESS
[FreeTextEntry1] : PCP: Corrie De Dios MD  Patient is a 54-year-old premenopausal, Myriad negative, female here today for f/u. Complaining of occasional right breast discomfort and nipple sensitivity. Pt is s/p 12/28/23 R breast re excision for close inferior margin. Here with her daughter, Bradford. Patient requesting daughter translate.  11/10/23 R lumpectomy and L oncoplastics per Dr. Garcia for right upper outer quadrant DCIS, ER/[place]+.  11/10/23 Surgical path: R- DCIS, 30mm, micropapillary, cribriform and solid with intermediate and high grade nuclear atypia and central necrosis. Focally present <1mm from inked inferior, posterior, and anterior tissue edges. Calcs present in DCIS. Uninvolved breast tissue showed apocrine cysts. Unremarkable skin. Bx site change. L- Unremarkable. pTisNx. However additional margins were removed superiorly and laterally (which was the anterior margin), and they were negative. The posterior margin is at the pectoralis. Only the inferior margin, would need to be reexcised. 12/28/23 Surgical path: DCIS Gr2, 3mm. 2mm from closest inked inferior resection margin. Prior bx site change with fat necrosis and fibrosis.  S/p EBRT with Dr. Fairchild. On Tamoxifen per Dr. Lanier.  9/29/23 ZP, R breast UOQ stereo bx path: DCIS. Gr2-3. ER+ %, [place]+ %. In Right UOQ, in areas with calcs and without calcs. Concordant.  11/1/23 Tati, ZAMZAM: R- Signal void from tissue marker is seen in the UOQ right breast on image 113 of the axial series. Motion artifact does limit fine detail. Adjacent to the tissue marker is linear nonmass-like enhancement consistent with biopsy-proven DCIS. This is best seen on images 105-108. A small post biopsy hematoma is noted on image 108. No additional suspicious enhancement to suggest multicentric disease. The distribution of pleomorphic calcifications represent a large area of malignancy than what is seen on the MRI. The pleomorphic calcifications measure up to 5.3 cm in AP diameter and up to 5 cm in a cranial to caudad dimension. L- no susp enhancement L breast. Axilla/other: No significant axillary or internal mammary lymphadenopathy. Impression: Biopsy-proven malignancy right breast as above. The pleomorphic calcifications demonstrate a larger area of involvement than the MRI suggests. Measurement of the pleomorphic calcifications given above. Rec surgical or oncological management. BR6  8/7/24 [place] Radha, Corina mmg and u/s: Extremely dense breasts, bilat multiple cysts and cyst clusters. BR2.-Ozvath  Fhx: No known family history of cancers

## 2024-08-12 NOTE — DATA REVIEWED
[FreeTextEntry1] : 8/7/24 [place], Bilat mmg and u/s: Extremely dense breasts, bilat multiple cysts and cyst clusters. [facility]

## 2024-08-12 NOTE — PHYSICAL EXAM
[Normocephalic] : normocephalic [Atraumatic] : atraumatic [Supple] : supple [No Supraclavicular Adenopathy] : no supraclavicular adenopathy [No Thyromegaly] : no thyromegaly [Examined in the supine and seated position] : examined in the supine and seated position [Symmetrical] : symmetrical [Bra Size: ___] : Bra Size: [unfilled] [No dominant masses] : no dominant masses in right breast  [No dominant masses] : no dominant masses left breast [No Nipple Retraction] : no left nipple retraction [No Nipple Discharge] : no left nipple discharge [No Axillary Lymphadenopathy] : no left axillary lymphadenopathy [No Edema] : no edema [No Swelling] : no swelling [Full ROM] : full range of motion [No Rashes] : no rashes [No Ulceration] : no ulceration [de-identified] : bilateral oncoplastics scars healed well. R breast with mild ERBT changes. ENRICO

## 2024-08-12 NOTE — HISTORY OF PRESENT ILLNESS
[FreeTextEntry1] : PCP: Corrie De Dios MD  Patient is a 54-year-old premenopausal, Myriad negative, female here today for f/u. Complaining of occasional right breast discomfort and nipple sensitivity. Pt is s/p 12/28/23 R breast re excision for close inferior margin. Here with her daughter, Bradford. Patient requesting daughter translate.  11/10/23 R lumpectomy and L oncoplastics per Dr. Garcia for right upper outer quadrant DCIS, ER/[place]+.  11/10/23 Surgical path: R- DCIS, 30mm, micropapillary, cribriform and solid with intermediate and high grade nuclear atypia and central necrosis. Focally present <1mm from inked inferior, posterior, and anterior tissue edges. Calcs present in DCIS. Uninvolved breast tissue showed apocrine cysts. Unremarkable skin. Bx site change. L- Unremarkable. pTisNx. However additional margins were removed superiorly and laterally (which was the anterior margin), and they were negative. The posterior margin is at the pectoralis. Only the inferior margin, would need to be reexcised. 12/28/23 Surgical path: DCIS Gr2, 3mm. 2mm from closest inked inferior resection margin. Prior bx site change with fat necrosis and fibrosis.  S/p EBRT with Dr. Fairchild. On Tamoxifen per Dr. Lanier.  9/29/23 ZP, R breast UOQ stereo bx path: DCIS. Gr2-3. ER+ %, [place]+ %. In Right UOQ, in areas with calcs and without calcs. Concordant.  11/1/23 Tati, ZAMZAM: R- Signal void from tissue marker is seen in the UOQ right breast on image 113 of the axial series. Motion artifact does limit fine detail. Adjacent to the tissue marker is linear nonmass-like enhancement consistent with biopsy-proven DCIS. This is best seen on images 105-108. A small post biopsy hematoma is noted on image 108. No additional suspicious enhancement to suggest multicentric disease. The distribution of pleomorphic calcifications represent a large area of malignancy than what is seen on the MRI. The pleomorphic calcifications measure up to 5.3 cm in AP diameter and up to 5 cm in a cranial to caudad dimension. L- no susp enhancement L breast. Axilla/other: No significant axillary or internal mammary lymphadenopathy. Impression: Biopsy-proven malignancy right breast as above. The pleomorphic calcifications demonstrate a larger area of involvement than the MRI suggests. Measurement of the pleomorphic calcifications given above. Rec surgical or oncological management. BR6  8/7/24 [place] Radha, Corina mmg and u/s: Extremely dense breasts, bilat multiple cysts and cyst clusters. BR2.-Ozvath  Fhx: No known family history of cancers

## 2024-08-12 NOTE — DATA REVIEWED
[FreeTextEntry1] : 8/7/24 [place], Bilat mmg and u/s: Extremely dense breasts, bilat multiple cysts and cyst clusters. BR2.-[facility]

## 2024-08-12 NOTE — ASSESSMENT
[FreeTextEntry1] : PCP: Corrie De Dios MD  Patient is a 54-year-old premenopausal, Myriad negative, female here today for f/u. Complaining of occasional right breast discomfort and nipple sensitivity. Pt is s/p 12/28/23 R breast re excision for close inferior margin. Here with her daughter, Bradford. Patient requesting daughter translate.  11/10/23 R lumpectomy and L oncoplastics per Dr. Garcia for right upper outer quadrant DCIS, ER/[place]+.  11/10/23 Surgical path: R- DCIS, 30mm, micropapillary, cribriform and solid with intermediate and high grade nuclear atypia and central necrosis. Focally present <1mm from inked inferior, posterior, and anterior tissue edges. Calcs present in DCIS. Uninvolved breast tissue showed apocrine cysts. Unremarkable skin. Bx site change. L- Unremarkable. pTisNx. However additional margins were removed superiorly and laterally (which was the anterior margin), and they were negative. The posterior margin is at the pectoralis. Only the inferior margin, would need to be reexcised. 12/28/23 Surgical path: DCIS Gr2, 3mm. 2mm from closest inked inferior resection margin. Prior bx site change with fat necrosis and fibrosis.  S/p EBRT with Dr. Fairchild.   9/29/23 ZP, R breast UOQ stereo bx path: DCIS. Gr2-3. ER+ %, [place]+ %. In Right UOQ, in areas with calcs and without calcs. Concordant.  11/1/23 ZAMZAM Duffy: R- Signal void from tissue marker is seen in the UOQ right breast on image 113 of the axial series. Motion artifact does limit fine detail. Adjacent to the tissue marker is linear nonmass-like enhancement consistent with biopsy-proven DCIS. This is best seen on images 105-108. A small post biopsy hematoma is noted on image 108. No additional suspicious enhancement to suggest multicentric disease. The distribution of pleomorphic calcifications represent a large area of malignancy than what is seen on the MRI. The pleomorphic calcifications measure up to 5.3 cm in AP diameter and up to 5 cm in a cranial to caudad dimension. L- no susp enhancement L breast. Axilla/other: No significant axillary or internal mammary lymphadenopathy. Impression: Biopsy-proven malignancy right breast as above. The pleomorphic calcifications demonstrate a larger area of involvement than the MRI suggests. Measurement of the pleomorphic calcifications given above. Rec surgical or oncological management. BR6  8/7/24 [place], Bilat mmg and u/s: Extremely dense breasts, bilat multiple cysts and cyst clusters. BR2.-Ozvath  Fhx: No known family history of cancers  CBE: bilateral oncoplastics scars healed well. R breast with mild ERBT changes. ENRICO On tamoxifen per Dr. Lanier. Again discussed with patient her exercise regimen. Discussed exercise oncology and the importance of regular exercise, 300min per week, including cardiac and resistance training to overall health and also reduction of 20-30% for recurrence. Discussed virtual exercise programs, [employer]. Pt has appt with DR. Kuo scheduled.  Rec symptomatic management for occasional mastalgia, prob due to prior surgery and radiation. Extra strength tylenol ok. Advil ok if pt can take but she does have hx of ringing in ears with naprosyn.    PLAN: Pt is out 1.5 years.  F/u 6 mos F/u with Yannick Hurst and Radha as scheduled. She has consult scheduled with Dr. Kuo about exercise oncology.

## 2024-08-12 NOTE — ASSESSMENT
[FreeTextEntry1] : PCP: Corrie De Dios MD  Patient is a 54-year-old premenopausal, Myriad negative, female here today for f/u. Complaining of occasional right breast discomfort and nipple sensitivity. Pt is s/p 12/28/23 R breast re excision for close inferior margin. Here with her daughter, Bradford. Patient requesting daughter translate.  11/10/23 R lumpectomy and L oncoplastics per Dr. Garcia for right upper outer quadrant DCIS, ER/[place]+.  11/10/23 Surgical path: R- DCIS, 30mm, micropapillary, cribriform and solid with intermediate and high grade nuclear atypia and central necrosis. Focally present <1mm from inked inferior, posterior, and anterior tissue edges. Calcs present in DCIS. Uninvolved breast tissue showed apocrine cysts. Unremarkable skin. Bx site change. L- Unremarkable. pTisNx. However additional margins were removed superiorly and laterally (which was the anterior margin), and they were negative. The posterior margin is at the pectoralis. Only the inferior margin, would need to be reexcised. 12/28/23 Surgical path: DCIS Gr2, 3mm. 2mm from closest inked inferior resection margin. Prior bx site change with fat necrosis and fibrosis.  S/p EBRT with Dr. Fairchild.   9/29/23 ZP, R breast UOQ stereo bx path: DCIS. Gr2-3. ER+ %, [place]+ %. In Right UOQ, in areas with calcs and without calcs. Concordant.  11/1/23 ZAMZAM Duffy: R- Signal void from tissue marker is seen in the UOQ right breast on image 113 of the axial series. Motion artifact does limit fine detail. Adjacent to the tissue marker is linear nonmass-like enhancement consistent with biopsy-proven DCIS. This is best seen on images 105-108. A small post biopsy hematoma is noted on image 108. No additional suspicious enhancement to suggest multicentric disease. The distribution of pleomorphic calcifications represent a large area of malignancy than what is seen on the MRI. The pleomorphic calcifications measure up to 5.3 cm in AP diameter and up to 5 cm in a cranial to caudad dimension. L- no susp enhancement L breast. Axilla/other: No significant axillary or internal mammary lymphadenopathy. Impression: Biopsy-proven malignancy right breast as above. The pleomorphic calcifications demonstrate a larger area of involvement than the MRI suggests. Measurement of the pleomorphic calcifications given above. Rec surgical or oncological management. BR6  8/7/24 [place], Bilat mmg and u/s: Extremely dense breasts, bilat multiple cysts and cyst clusters. BR2.-Ozvath  Fhx: No known family history of cancers  CBE: bilateral oncoplastics scars healed well. R breast with mild ERBT changes. ENRICO On tamoxifen per Dr. Lanier. Again discussed with patient her exercise regimen. Discussed exercise oncology and the importance of regular exercise, 300min per week, including cardiac and resistance training to overall health and also reduction of 20-30% for recurrence. Discussed virtual exercise programs, [employer]. Pt has appt with DR. Kuo scheduled.  Rec symptomatic management for occasional mastalgia, prob due to prior surgery and radiation. Extra strength tylenol ok. Advil ok if pt can take but she does have hx of ringing in ears with naprosyn.    PLAN: Pt is out 1.5 years.  F/u 6 mos F/u with Yannick Hurst and Radha as scheduled. She has consult scheduled with Dr. Kuo about exercise oncology.

## 2024-08-14 ENCOUNTER — APPOINTMENT (OUTPATIENT)
Dept: PHYSICAL MEDICINE AND REHAB | Facility: CLINIC | Age: 54
End: 2024-08-14
Payer: MEDICAID

## 2024-08-14 ENCOUNTER — APPOINTMENT (OUTPATIENT)
Dept: PLASTIC SURGERY | Facility: CLINIC | Age: 54
End: 2024-08-14

## 2024-08-14 VITALS
OXYGEN SATURATION: 98 % | BODY MASS INDEX: 25.66 KG/M2 | TEMPERATURE: 98.2 F | DIASTOLIC BLOOD PRESSURE: 68 MMHG | HEART RATE: 66 BPM | HEIGHT: 65 IN | WEIGHT: 154 LBS | SYSTOLIC BLOOD PRESSURE: 106 MMHG

## 2024-08-14 VITALS
DIASTOLIC BLOOD PRESSURE: 68 MMHG | SYSTOLIC BLOOD PRESSURE: 103 MMHG | WEIGHT: 157 LBS | HEART RATE: 68 BPM | RESPIRATION RATE: 14 BRPM | BODY MASS INDEX: 26.8 KG/M2 | HEIGHT: 64 IN

## 2024-08-14 DIAGNOSIS — Z98.890 OTHER SPECIFIED POSTPROCEDURAL STATES: ICD-10-CM

## 2024-08-14 DIAGNOSIS — I89.0 LYMPHEDEMA, NOT ELSEWHERE CLASSIFIED: ICD-10-CM

## 2024-08-14 DIAGNOSIS — Z85.3 PERSONAL HISTORY OF MALIGNANT NEOPLASM OF BREAST: ICD-10-CM

## 2024-08-14 DIAGNOSIS — G89.28 OTHER CHRONIC POSTPROCEDURAL PAIN: ICD-10-CM

## 2024-08-14 DIAGNOSIS — Z09 ENCOUNTER FOR FOLLOW-UP EXAMINATION AFTER COMPLETED TREATMENT FOR CONDITIONS OTHER THAN MALIGNANT NEOPLASM: ICD-10-CM

## 2024-08-14 DIAGNOSIS — L90.5 SCAR CONDITIONS AND FIBROSIS OF SKIN: ICD-10-CM

## 2024-08-14 DIAGNOSIS — Z42.1 ENCOUNTER FOR BREAST RECONSTRUCTION FOLLOWING MASTECTOMY: ICD-10-CM

## 2024-08-14 PROCEDURE — 99204 OFFICE O/P NEW MOD 45 MIN: CPT

## 2024-08-14 PROCEDURE — 99213 OFFICE O/P EST LOW 20 MIN: CPT

## 2024-08-14 NOTE — PHYSICAL EXAM
[FreeTextEntry1] : Gen: Patient is A&O x 3, NAD HEENT: EOMI, hearing grossly normal Resp: regular, non - labored CV: pulses regular Skin: no rashes, erythema Lymph: No edema in RUE, +Edema in right breast  Inspection: no instability  ROM: full throughout Palpation: TTP right pectoral muscle  Sensation: intact to light touch Reflexes: 1+ and symmetric throughout Strength: 5/5 throughout Special tests: -Abel sign  Gait: normal, non-antalgic

## 2024-08-14 NOTE — ASSESSMENT
[FreeTextEntry1] : 54 year old female presenting for evaluation.  #Post-mastectomy pain syndrome: -Breast surgery notes reviewed, s/p lumpectomy -Plastic surgery notes reviewed, s/p breast reconstruction -Heme-onc notes reviewed, on tamoxifen  -Secondary to fibrosis -Counseled on home exercise program including aerobic activity and strength training -Start PT for manual therapy and chest wall stretching  #Right breast lymphedema: -Start MLD in PT -Chip pad/swell spot evaluation  Follow up in 6 weeks.

## 2024-08-14 NOTE — REASON FOR VISIT
[Follow-Up: _____] : a [unfilled] follow-up visit [Family Member] : family member [FreeTextEntry1] : patient states she is having some pain around her right breast.

## 2024-08-14 NOTE — ASSESSMENT
[FreeTextEntry1] : 53 y/o female for follow up   Healed well Patient is to follow up in December at year anniversary of surgery All questions and concerns addressed Plan and patient status as per Dr Garcia.

## 2024-08-14 NOTE — HISTORY OF PRESENT ILLNESS
[FreeTextEntry1] : Patient presents with her daughter and states that she is feeling really good.  She states that she has occasional discomfort in her right breast and that it feels tighter than the left breast.  She has no other complaints today.

## 2024-08-14 NOTE — PHYSICAL EXAM
[NI] : Normal [de-identified] : Right: Incisions are healed well, some radiation changes in the skin are noted.  The skin of right breast is a bit firmer and there is some tightness noted at axillary incision. Left: Healed well

## 2024-08-14 NOTE — HISTORY OF PRESENT ILLNESS
[FreeTextEntry1] : Ms. Torres is a 54 year old female with history of Ductal carcinoma in situ (DCIS) of right breast.  She underwent right breast lumpectomy w/ left oncoplastics on 11/10/2023 with Dr. Carlyn Walker & Dr. Leigh Garcia.  S/p left breast re-excision on 12/28/23 - pathology showed residual DCIS (largest measuring 3mm, 2mm from closest margin). Has healed well from surgery. Completed radiation w/ Dr. Fairchlid in 3/2024.  Initiated Tamoxifen 20 mg daily on 3/25/24.    She reports that she has noticed tightness in her right chest wall and breast.  Sometimes she feels heaviness and fullness in her breast.  Denies any swelling or heaviness right upper extremity.  No focal weakness.  No shoulder pain.  No numbness.  No erythema or increased warmth.

## 2024-08-19 ENCOUNTER — APPOINTMENT (OUTPATIENT)
Dept: BREAST CENTER | Facility: CLINIC | Age: 54
End: 2024-08-19

## 2024-09-06 NOTE — ED PROVIDER NOTE - TOBACCO USE
Pt presents from home for evaluation of AMS starting on  8/29.  No focal neurological deficits.   Baseline:patient able to do all chores around the house . Able to drive, cook, clean himself, pick out clothes  CTA head/ neck with no evidence of intercranial hemorrhage, Severe chronic microvascular ischemic change and superimposed chronic infarcts as detailed above. Age-indeterminate right temporal infarct, likely chronic.   EKG sinus without acute ischemic changes  CXR normal  MRI 8/31: Acute infarct involving the right temporal lobe with petechial hemorrhage.  -Small area of acute infarct within the left parietal periventricular white matter.  No clot on echo, tele was normal sinus no events  Placed on ASA and high intensity statin  loop recorder/zio as op  Fall and delirium precautions  Pending Placement  Continue PT OT  Medically clear for discharge   Never smoker

## 2024-09-16 ENCOUNTER — APPOINTMENT (OUTPATIENT)
Dept: HEMATOLOGY ONCOLOGY | Facility: CLINIC | Age: 54
End: 2024-09-16

## 2024-09-25 ENCOUNTER — APPOINTMENT (OUTPATIENT)
Dept: PHYSICAL MEDICINE AND REHAB | Facility: CLINIC | Age: 54
End: 2024-09-25

## 2024-10-03 ENCOUNTER — OUTPATIENT (OUTPATIENT)
Dept: OUTPATIENT SERVICES | Facility: HOSPITAL | Age: 54
LOS: 1 days | End: 2024-10-03

## 2024-10-03 DIAGNOSIS — D64.9 ANEMIA, UNSPECIFIED: ICD-10-CM

## 2024-10-03 DIAGNOSIS — D05.11 INTRADUCTAL CARCINOMA IN SITU OF RIGHT BREAST: ICD-10-CM

## 2024-10-30 ENCOUNTER — APPOINTMENT (OUTPATIENT)
Dept: OTOLARYNGOLOGY | Facility: CLINIC | Age: 54
End: 2024-10-30

## 2024-11-01 ENCOUNTER — OUTPATIENT (OUTPATIENT)
Dept: OUTPATIENT SERVICES | Facility: HOSPITAL | Age: 54
LOS: 1 days | End: 2024-11-01

## 2024-11-01 DIAGNOSIS — D05.11 INTRADUCTAL CARCINOMA IN SITU OF RIGHT BREAST: ICD-10-CM

## 2024-11-04 ENCOUNTER — APPOINTMENT (OUTPATIENT)
Dept: HEMATOLOGY ONCOLOGY | Facility: CLINIC | Age: 54
End: 2024-11-04
Payer: MEDICAID

## 2024-11-04 VITALS
HEART RATE: 66 BPM | OXYGEN SATURATION: 99 % | WEIGHT: 149.8 LBS | HEIGHT: 64 IN | BODY MASS INDEX: 25.57 KG/M2 | TEMPERATURE: 98.1 F | SYSTOLIC BLOOD PRESSURE: 116 MMHG | DIASTOLIC BLOOD PRESSURE: 74 MMHG

## 2024-11-04 DIAGNOSIS — D05.11 INTRADUCTAL CARCINOMA IN SITU OF RIGHT BREAST: ICD-10-CM

## 2024-11-04 PROCEDURE — G2211 COMPLEX E/M VISIT ADD ON: CPT | Mod: NC

## 2024-11-04 PROCEDURE — 99214 OFFICE O/P EST MOD 30 MIN: CPT

## 2024-12-04 ENCOUNTER — NON-APPOINTMENT (OUTPATIENT)
Age: 54
End: 2024-12-04

## 2024-12-04 ENCOUNTER — APPOINTMENT (OUTPATIENT)
Dept: OPHTHALMOLOGY | Facility: CLINIC | Age: 54
End: 2024-12-04
Payer: MEDICAID

## 2024-12-04 PROCEDURE — 92004 COMPRE OPH EXAM NEW PT 1/>: CPT

## 2024-12-05 ENCOUNTER — NON-APPOINTMENT (OUTPATIENT)
Age: 54
End: 2024-12-05

## 2025-01-23 ENCOUNTER — APPOINTMENT (OUTPATIENT)
Dept: VASCULAR SURGERY | Facility: CLINIC | Age: 55
End: 2025-01-23
Payer: MEDICAID

## 2025-01-23 VITALS
HEIGHT: 64 IN | WEIGHT: 153 LBS | SYSTOLIC BLOOD PRESSURE: 110 MMHG | OXYGEN SATURATION: 98 % | HEART RATE: 69 BPM | DIASTOLIC BLOOD PRESSURE: 60 MMHG | BODY MASS INDEX: 26.12 KG/M2 | RESPIRATION RATE: 16 BRPM

## 2025-01-23 DIAGNOSIS — I83.893 VARICOSE VEINS OF BILATERAL LOWER EXTREMITIES WITH OTHER COMPLICATIONS: ICD-10-CM

## 2025-01-23 PROCEDURE — 93970 EXTREMITY STUDY: CPT

## 2025-01-23 PROCEDURE — 99203 OFFICE O/P NEW LOW 30 MIN: CPT

## 2025-01-23 RX ORDER — CLOBETASOL PROPIONATE CREAM USP, 0.05% 0.5 MG/G
0.05 CREAM TOPICAL TWICE DAILY
Qty: 2 | Refills: 3 | Status: ACTIVE | COMMUNITY
Start: 2025-01-23 | End: 1900-01-01

## 2025-01-23 RX ORDER — BACLOFEN 10 MG/1
10 TABLET ORAL 3 TIMES DAILY
Qty: 30 | Refills: 0 | Status: ACTIVE | COMMUNITY
Start: 2025-01-23 | End: 1900-01-01

## 2025-01-23 RX ORDER — DICLOFENAC POTASSIUM 50 MG/1
50 TABLET, COATED ORAL
Qty: 30 | Refills: 3 | Status: ACTIVE | COMMUNITY
Start: 2025-01-23 | End: 1900-01-01

## 2025-02-03 ENCOUNTER — APPOINTMENT (OUTPATIENT)
Dept: BREAST CENTER | Facility: CLINIC | Age: 55
End: 2025-02-03
Payer: MEDICAID

## 2025-02-03 ENCOUNTER — NON-APPOINTMENT (OUTPATIENT)
Age: 55
End: 2025-02-03

## 2025-02-03 VITALS
TEMPERATURE: 97.9 F | WEIGHT: 154 LBS | SYSTOLIC BLOOD PRESSURE: 118 MMHG | HEART RATE: 62 BPM | HEIGHT: 64 IN | BODY MASS INDEX: 26.29 KG/M2 | DIASTOLIC BLOOD PRESSURE: 76 MMHG | OXYGEN SATURATION: 99 %

## 2025-02-03 DIAGNOSIS — Z78.9 OTHER SPECIFIED HEALTH STATUS: ICD-10-CM

## 2025-02-03 DIAGNOSIS — D05.11 INTRADUCTAL CARCINOMA IN SITU OF RIGHT BREAST: ICD-10-CM

## 2025-02-03 PROCEDURE — 99214 OFFICE O/P EST MOD 30 MIN: CPT

## 2025-02-03 RX ORDER — DICLOFENAC SODIUM 50 MG/1
50 TABLET, DELAYED RELEASE ORAL
Qty: 30 | Refills: 0 | Status: ACTIVE | COMMUNITY
Start: 2025-02-03 | End: 1900-01-01

## 2025-02-07 ENCOUNTER — NON-APPOINTMENT (OUTPATIENT)
Age: 55
End: 2025-02-07

## 2025-02-07 ENCOUNTER — APPOINTMENT (OUTPATIENT)
Dept: OPHTHALMOLOGY | Facility: CLINIC | Age: 55
End: 2025-02-07
Payer: COMMERCIAL

## 2025-02-07 PROCEDURE — 92002 INTRM OPH EXAM NEW PATIENT: CPT

## 2025-02-26 ENCOUNTER — APPOINTMENT (OUTPATIENT)
Dept: MRI IMAGING | Facility: CLINIC | Age: 55
End: 2025-02-26
Payer: MEDICAID

## 2025-02-26 ENCOUNTER — NON-APPOINTMENT (OUTPATIENT)
Age: 55
End: 2025-02-26

## 2025-02-26 ENCOUNTER — OUTPATIENT (OUTPATIENT)
Dept: OUTPATIENT SERVICES | Facility: HOSPITAL | Age: 55
LOS: 1 days | End: 2025-02-26
Payer: MEDICAID

## 2025-02-26 DIAGNOSIS — R92.30 DENSE BREASTS, UNSPECIFIED: ICD-10-CM

## 2025-02-26 DIAGNOSIS — R92.8 OTHER ABNORMAL AND INCONCLUSIVE FINDINGS ON DIAGNOSTIC IMAGING OF BREAST: ICD-10-CM

## 2025-02-26 DIAGNOSIS — Z00.8 ENCOUNTER FOR OTHER GENERAL EXAMINATION: ICD-10-CM

## 2025-02-26 DIAGNOSIS — D05.11 INTRADUCTAL CARCINOMA IN SITU OF RIGHT BREAST: ICD-10-CM

## 2025-02-26 PROCEDURE — C8908: CPT

## 2025-02-26 PROCEDURE — 77049 MRI BREAST C-+ W/CAD BI: CPT | Mod: 26

## 2025-02-26 PROCEDURE — A9585: CPT

## 2025-02-26 PROCEDURE — C8937: CPT

## 2025-03-03 ENCOUNTER — RESULT REVIEW (OUTPATIENT)
Age: 55
End: 2025-03-03

## 2025-03-03 ENCOUNTER — APPOINTMENT (OUTPATIENT)
Dept: MRI IMAGING | Facility: CLINIC | Age: 55
End: 2025-03-03
Payer: MEDICAID

## 2025-03-03 ENCOUNTER — OUTPATIENT (OUTPATIENT)
Dept: OUTPATIENT SERVICES | Facility: HOSPITAL | Age: 55
LOS: 1 days | End: 2025-03-03
Payer: MEDICAID

## 2025-03-03 DIAGNOSIS — R92.8 OTHER ABNORMAL AND INCONCLUSIVE FINDINGS ON DIAGNOSTIC IMAGING OF BREAST: ICD-10-CM

## 2025-03-03 PROCEDURE — 77065 DX MAMMO INCL CAD UNI: CPT | Mod: 26,RT

## 2025-03-03 PROCEDURE — 77065 DX MAMMO INCL CAD UNI: CPT

## 2025-03-03 PROCEDURE — 19085 BX BREAST 1ST LESION MR IMAG: CPT | Mod: RT

## 2025-03-03 PROCEDURE — A4648: CPT

## 2025-03-03 PROCEDURE — A9585: CPT

## 2025-03-03 PROCEDURE — 19085 BX BREAST 1ST LESION MR IMAG: CPT

## 2025-04-24 ENCOUNTER — APPOINTMENT (OUTPATIENT)
Dept: VASCULAR SURGERY | Facility: CLINIC | Age: 55
End: 2025-04-24

## 2025-04-28 ENCOUNTER — OUTPATIENT (OUTPATIENT)
Dept: OUTPATIENT SERVICES | Facility: HOSPITAL | Age: 55
LOS: 1 days | End: 2025-04-28

## 2025-04-28 DIAGNOSIS — D05.11 INTRADUCTAL CARCINOMA IN SITU OF RIGHT BREAST: ICD-10-CM

## 2025-05-01 ENCOUNTER — APPOINTMENT (OUTPATIENT)
Dept: HEMATOLOGY ONCOLOGY | Facility: CLINIC | Age: 55
End: 2025-05-01
Payer: MEDICAID

## 2025-05-01 VITALS
SYSTOLIC BLOOD PRESSURE: 120 MMHG | HEIGHT: 64 IN | TEMPERATURE: 98.3 F | BODY MASS INDEX: 25.61 KG/M2 | WEIGHT: 150 LBS | HEART RATE: 60 BPM | OXYGEN SATURATION: 98 % | DIASTOLIC BLOOD PRESSURE: 74 MMHG

## 2025-05-01 DIAGNOSIS — D05.11 INTRADUCTAL CARCINOMA IN SITU OF RIGHT BREAST: ICD-10-CM

## 2025-05-01 PROCEDURE — 99214 OFFICE O/P EST MOD 30 MIN: CPT

## 2025-08-26 ENCOUNTER — APPOINTMENT (OUTPATIENT)
Dept: PLASTIC SURGERY | Facility: CLINIC | Age: 55
End: 2025-08-26
Payer: MEDICAID

## 2025-08-26 VITALS
WEIGHT: 160 LBS | DIASTOLIC BLOOD PRESSURE: 70 MMHG | TEMPERATURE: 97.8 F | HEART RATE: 62 BPM | HEIGHT: 64 IN | OXYGEN SATURATION: 98 % | BODY MASS INDEX: 27.31 KG/M2 | SYSTOLIC BLOOD PRESSURE: 106 MMHG

## 2025-08-26 DIAGNOSIS — Z98.890 OTHER SPECIFIED POSTPROCEDURAL STATES: ICD-10-CM

## 2025-08-26 DIAGNOSIS — Z85.3 PERSONAL HISTORY OF MALIGNANT NEOPLASM OF BREAST: ICD-10-CM

## 2025-08-26 DIAGNOSIS — L90.5 SCAR CONDITIONS AND FIBROSIS OF SKIN: ICD-10-CM

## 2025-08-26 PROCEDURE — 99213 OFFICE O/P EST LOW 20 MIN: CPT

## 2025-09-17 ENCOUNTER — APPOINTMENT (OUTPATIENT)
Dept: BREAST CENTER | Facility: CLINIC | Age: 55
End: 2025-09-17

## 2025-09-18 ENCOUNTER — APPOINTMENT (OUTPATIENT)
Dept: ULTRASOUND IMAGING | Facility: CLINIC | Age: 55
End: 2025-09-18
Payer: MEDICAID

## 2025-09-18 ENCOUNTER — RESULT REVIEW (OUTPATIENT)
Age: 55
End: 2025-09-18

## 2025-09-18 ENCOUNTER — APPOINTMENT (OUTPATIENT)
Dept: MAMMOGRAPHY | Facility: CLINIC | Age: 55
End: 2025-09-18
Payer: MEDICAID

## 2025-09-18 PROCEDURE — 76641 ULTRASOUND BREAST COMPLETE: CPT | Mod: 26,50

## 2025-09-18 PROCEDURE — G0279: CPT | Mod: 26

## 2025-09-18 PROCEDURE — 77066 DX MAMMO INCL CAD BI: CPT | Mod: 26

## 2025-09-19 ENCOUNTER — NON-APPOINTMENT (OUTPATIENT)
Age: 55
End: 2025-09-19

## (undated) DEVICE — STERIS DEFENDO 3-PIECE KIT (AIR/WATER, SUCTION & BIOPSY VALVES)

## (undated) DEVICE — FORCEP ENDOJAW AGTR LC W NDL 2.8MM 230CM DISP